# Patient Record
Sex: FEMALE | Race: ASIAN | NOT HISPANIC OR LATINO | Employment: OTHER | URBAN - METROPOLITAN AREA
[De-identification: names, ages, dates, MRNs, and addresses within clinical notes are randomized per-mention and may not be internally consistent; named-entity substitution may affect disease eponyms.]

---

## 2018-12-14 ENCOUNTER — HOSPITAL ENCOUNTER (OUTPATIENT)
Dept: ULTRASOUND IMAGING | Facility: HOSPITAL | Age: 28
Discharge: HOME | End: 2018-12-14
Attending: FAMILY MEDICINE

## 2018-12-14 ENCOUNTER — COMMUNICATION - HEALTHEAST (OUTPATIENT)
Dept: FAMILY MEDICINE | Facility: CLINIC | Age: 28
End: 2018-12-14

## 2018-12-14 ENCOUNTER — OFFICE VISIT - HEALTHEAST (OUTPATIENT)
Dept: FAMILY MEDICINE | Facility: CLINIC | Age: 28
End: 2018-12-14

## 2018-12-14 ENCOUNTER — COMMUNICATION - HEALTHEAST (OUTPATIENT)
Dept: TELEHEALTH | Facility: CLINIC | Age: 28
End: 2018-12-14

## 2018-12-14 DIAGNOSIS — L70.0 ACNE VULGARIS: ICD-10-CM

## 2018-12-14 DIAGNOSIS — Z30.011 ENCOUNTER FOR INITIAL PRESCRIPTION OF CONTRACEPTIVE PILLS: ICD-10-CM

## 2018-12-14 DIAGNOSIS — N83.201 RIGHT OVARIAN CYST: ICD-10-CM

## 2018-12-14 DIAGNOSIS — R53.83 FATIGUE, UNSPECIFIED TYPE: ICD-10-CM

## 2018-12-14 DIAGNOSIS — R10.32 LLQ ABDOMINAL PAIN: ICD-10-CM

## 2018-12-14 DIAGNOSIS — Z76.89 ENCOUNTER TO ESTABLISH CARE: ICD-10-CM

## 2018-12-14 LAB
ERYTHROCYTE [DISTWIDTH] IN BLOOD BY AUTOMATED COUNT: 12.8 % (ref 11–14.5)
HCG UR QL: NEGATIVE
HCT VFR BLD AUTO: 37.2 % (ref 35–47)
HGB BLD-MCNC: 12.3 G/DL (ref 12–16)
MCH RBC QN AUTO: 27.2 PG (ref 27–34)
MCHC RBC AUTO-ENTMCNC: 33 G/DL (ref 32–36)
MCV RBC AUTO: 82 FL (ref 80–100)
PLATELET # BLD AUTO: 382 THOU/UL (ref 140–440)
PMV BLD AUTO: 7.2 FL (ref 7–10)
RBC # BLD AUTO: 4.51 MILL/UL (ref 3.8–5.4)
SP GR UR STRIP: 1.02 (ref 1–1.03)
TSH SERPL DL<=0.005 MIU/L-ACNC: 1.82 UIU/ML (ref 0.3–5)
WBC: 8 THOU/UL (ref 4–11)

## 2018-12-14 ASSESSMENT — MIFFLIN-ST. JEOR: SCORE: 1751.22

## 2018-12-18 ENCOUNTER — COMMUNICATION - HEALTHEAST (OUTPATIENT)
Dept: FAMILY MEDICINE | Facility: CLINIC | Age: 28
End: 2018-12-18

## 2019-10-03 ENCOUNTER — OFFICE VISIT (OUTPATIENT)
Dept: URGENT CARE | Facility: URGENT CARE | Age: 29
End: 2019-10-03
Payer: COMMERCIAL

## 2019-10-03 VITALS
TEMPERATURE: 98.2 F | DIASTOLIC BLOOD PRESSURE: 78 MMHG | OXYGEN SATURATION: 97 % | RESPIRATION RATE: 16 BRPM | SYSTOLIC BLOOD PRESSURE: 118 MMHG | HEART RATE: 93 BPM | WEIGHT: 252 LBS

## 2019-10-03 DIAGNOSIS — J02.9 VIRAL PHARYNGITIS: Primary | ICD-10-CM

## 2019-10-03 DIAGNOSIS — J02.9 SORE THROAT: ICD-10-CM

## 2019-10-03 LAB
DEPRECATED S PYO AG THROAT QL EIA: NORMAL
SPECIMEN SOURCE: NORMAL

## 2019-10-03 PROCEDURE — 99203 OFFICE O/P NEW LOW 30 MIN: CPT | Performed by: PHYSICIAN ASSISTANT

## 2019-10-03 PROCEDURE — 87081 CULTURE SCREEN ONLY: CPT | Performed by: PHYSICIAN ASSISTANT

## 2019-10-03 PROCEDURE — 87880 STREP A ASSAY W/OPTIC: CPT | Performed by: FAMILY MEDICINE

## 2019-10-03 NOTE — LETTER
[facility]  [address]  [address]  [facility] [id number] [phone]          October 3, 2019    RE:  Elayne Burciaga                                                                                                                                                       [address]  [facility] [address]            To whom it may concern:    Elayne Burciaga was seen here today for evaluation of an acute medical problem. Please excuse her from missed work today and tomorrow.     She may return to full work Monday (10/7/19)      Sincerely,        Theodore Coronado PA-C

## 2019-10-03 NOTE — PROGRESS NOTES
Elayne Burciaga presents to  today for evaluation of ST, subjective fever, generalized waxing and waning body aches and generalized waxing and waning HA x 2 days  duration.     Illness Exp: Denies any close contact Strep or Mono exposure. Positive hx exposure to stomach flu at work     ROS:     HEENT: Positive as per above   RESP: No cough, wheezing or SOB   GI: 3 isolated small emesis (no bloody or black vomitu) Denies any diarrhea No abdominal pain.  URINARY REVIEW:  Still reports good PO fluid intake and normal urine output. Denies any dysuria, urinary urgency/frequency, hematuria, back or flank pain.   Normal BM's  SKIN: Denies rash  NEURO: Positive for generalized, waxing and waning HA as per above. Negative for severe HA, neck stiffness, mental status changes or lethargy.   MUS/SKEL: Positive for generalized, waxing and waning body aches since onset of ST 2 days ago. Denies any focal or one sided pain, weakness, numbness or tingling   RHEUM: denies any hx of RA or other inflammatory arthritis. Denies any red, warm, or swollen joints     Social History     Socioeconomic History     Marital status:      Spouse name: Not on file     Number of children: Not on file     Years of education: Not on file     Highest education level: Not on file   Occupational History     Not on file   Social Needs     Financial resource strain: Not on file     Food insecurity:     Worry: Not on file     Inability: Not on file     Transportation needs:     Medical: Not on file     Non-medical: Not on file   Tobacco Use     Smoking status: Never Smoker     Smokeless tobacco: Never Used   Substance and Sexual Activity     Alcohol use: Not on file     Drug use: Not on file     Sexual activity: Not on file   Lifestyle     Physical activity:     Days per week: Not on file     Minutes per session: Not on file     Stress: Not on file   Relationships     Social connections:     Talks on phone: Not on file     Gets together: Not on  file     Attends L[employer] service: Not on file     Active member of club or organization: Not on file     Attends meetings of clubs or organizations: Not on file     Relationship status: Not on file     Intimate partner violence:     Fear of current or ex partner: Not on file     Emotionally abused: Not on file     Physically abused: Not on file     Forced sexual activity: Not on file   Other Topics Concern     Not on file   Social History Narrative     Not on file       PMHX: Denies any chronic medical problems or chronic use of Rx medicaitons     No current outpatient medications on file.     No current facility-administered medications for this visit.          Allergies   Allergen Reactions     Codeine    OBJECTIVE:  /78   Pulse 93   Temp 98.2  F (36.8  C)   Resp 16   Wt 114.3 kg (252 lb)   SpO2 97%       General appearance: alert and no apparent distress  Skin color is pink and without rash.  HEENT:   Conjunctiva not injected.  Sclera clear.  Left TM is normal: no effusions, no erythema, and normal landmarks.  Right TM is normal: no effusions, no erythema, and normal landmarks.  Nasal mucosa is congested   Oropharyngeal exam is positive for mild, diffuse, erythema.  Uvula is midline. No plaque, exudate, lesions, or ulcers.   Neck is supple, FROM with no adenopathy  CARDIAC:NORMAL - regular rate and rhythm without murmur.  RESP: Normal - CTA without rales, rhonchi, or wheezing.  ABDOMEN:  Soft, non-tender, non-distended.  Positive normal bowel sounds.  No HSM or masses.  No suprapubic tenderness.  No CVA tenderness.  GYN: . No pelvic pain or abnl vaginal discharge. LMP at expected interval. LMP approx 10 days ago. Declines any HCG or STI screening today  NEURO: Alert and oriented.  Normal s peech and mentation.  CN II/XII grossly intact.  Gait within normal limits.        LAB:   Results for orders placed or performed in visit on 10/03/19   Rapid strep screen   Result Value Ref Range    Specimen  "Description Throat     Rapid Strep A Screen       NEGATIVE: No Group A streptococcal antigen detected by immunoassay, await culture report.         ASSESSMENT/PLAN:    (J02.9) Viral pharyngitis  (primary encounter diagnosis)  Comment: ST. RST negative. No evidence of secondary bacterial infection. Very reassuring exam here today.   Plan:   Follow-up with PCP if sxs change, worsen or fail to resolve with home comfort care measures over the next 5-7 days.  In addition to the above, \"red flag\" signs and sxs are reviewed with pt both verbally and by way of printed educational material for home review.  Pt verbalizes understanding of and agrees to the above plan.       (J02.9) Sore throat  Plan: Rapid strep screen          "

## 2019-10-03 NOTE — PATIENT INSTRUCTIONS
Patient Education     Viral Pharyngitis (Sore Throat)    You or your child have pharyngitis (sore throat). This infection is caused by a virus. It can cause throat pain that is worse when swallowing, aching all over, headache, and fever. The infection may be spread by coughing, kissing, or touching others after touching your mouth or nose. Antibiotic medicines do not work against viruses. They are not used for treating this illness.  Home care    If symptoms are severe, you or your child should rest at home. Return to work or school when you or your child feel well enough.     You or your child should drink plenty of fluids to prevent dehydration.    Use throat lozenges or numbing throat sprays to help reduce pain. Gargling with warm salt water will also help reduce throat pain. Dissolve 1/2 teaspoon of salt in 1 glass of warm water. Children can sip on juice or a popsicle. Children 5 years and older can also suck on a lollipop or hard candy.    Don t eat salty or spicy foods or give them to your child. These can be irritating to the throat.  Medicines for a child: You can give your child acetaminophen for fever, fussiness, or discomfort. In babies over 6 months of age, you may use ibuprofen instead of acetaminophen. If your child has chronic liver or kidney disease or ever had a stomach ulcer or GI bleeding, talk with your child s healthcare provider before giving these medicines. Aspirin should never be used by any child under 18 years of age who has a fever. It may cause severe liver damage.  Medicines for an adult: You may use acetaminophen or ibuprofen to control pain or fever, unless another medicine was prescribed for this. If you have chronic liver or kidney disease or ever had a stomach ulcer or GI bleeding, talk with your healthcare provider before using these medicines.  Follow-up care  Follow up with a healthcare provider or our staff if you or your child are not getting better over the next  week.  When to seek medical advice  Call your healthcare provider right away if any of these occur:    Fever as directed by your healthcare provider.  For children, seek care if:  ? Your child is of any age and has repeated fevers above 104 F (40 C).  ? Your child is younger than 2 years of age and has a fever of 100.4 F (38 C) for more than 1 day.  ? Your child is 2 years old or older and has a fever of 100.4 F (38 C) for more than 3 days.    New or worsening ear pain, sinus pain, or headache    Painful lumps in the back of neck    Stiff neck    Lymph nodes are getting larger    Can t swallow liquids, a lot of drooling, or can t open mouth wide due to throat pain    Signs of dehydration, such as very dark urine or no urine, sunken eyes, dizziness    Trouble breathing or noisy breathing    Muffled voice    New rash    Other symptoms are getting worse  Date Last Reviewed: 10/1/2017    [id number] [employer]. [address] [place] [address], [place] [address]. All rights reserved. This information is not intended as a substitute for professional medical care. Always follow your healthcare professional's instructions.

## 2019-10-04 LAB
BACTERIA SPEC CULT: NORMAL
SPECIMEN SOURCE: NORMAL

## 2019-10-10 ENCOUNTER — COMMUNICATION - HEALTHEAST (OUTPATIENT)
Dept: FAMILY MEDICINE | Facility: CLINIC | Age: 29
End: 2019-10-10

## 2019-11-01 ENCOUNTER — HEALTH MAINTENANCE LETTER (OUTPATIENT)
Age: 29
End: 2019-11-01

## 2020-01-06 ENCOUNTER — COMMUNICATION - HEALTHEAST (OUTPATIENT)
Dept: FAMILY MEDICINE | Facility: CLINIC | Age: 30
End: 2020-01-06

## 2020-09-14 ENCOUNTER — VIRTUAL VISIT (OUTPATIENT)
Dept: FAMILY MEDICINE | Facility: OTHER | Age: 30
End: 2020-09-14

## 2020-09-14 DIAGNOSIS — Z20.822 SUSPECTED COVID-19 VIRUS INFECTION: Primary | ICD-10-CM

## 2020-09-14 NOTE — PROGRESS NOTES
"Date: 2020 14:14:22  Clinician: Wil Macedo  Clinician NPI: [id number]  Patient: sam mendoza  Patient : 1990  Patient Address: [address] [place] [address]  Patient Phone: [phone]  Visit Protocol: URI  Patient Summary:  may is a 30 year old ( : 1990 ) female who initiated a [facility] Visit for COVID-19 (Coronavirus) evaluation and screening. When asked the question \"Please sign me up to receive news, health information and promotions. \", may responded \"No\".    may states her symptoms started gradually 3-4 days ago. After her symptoms started, they improved and then got worse again.   Her symptoms consist of ear pain, facial pain or pressure, vomiting, nausea, a sore throat, tooth pain, diarrhea, a cough, a headache, chills, and malaise.   Symptom details     Cough: may coughs a few times an hour and her cough is more bothersome at night. Phlegm comes into her throat when she coughs. She believes her cough is caused by post-nasal drip. The color of the phlegm is green.     Sore throat: may reports having moderate throat pain (4-6 on a 10 point pain scale), has exudate on her tonsils, and can swallow liquids. She is not sure if the lymph nodes in her neck are enlarged. A rash has not appeared on the skin since the sore throat started.     Facial pain or pressure: The facial pain or pressure feels worse when bending over or leaning forward.     Headache: She states the headache is severe (7-9 on a 10 point pain scale).     Tooth pain: The tooth pain is caused by a cavity, recent dental work, or other mouth problems.      may denies having anosmia, fever, rhinitis, wheezing, ageusia, nasal congestion, and myalgias. She also denies taking antibiotic medication in the past month, having recent facial or sinus surgery in the past 60 days, and having a sinus infection within the past year. She is not experiencing dyspnea.   Precipitating events  may is not sure if she has been " exposed to someone with strep throat. She has not recently been exposed to someone with influenza. may has been in close contact with the following high risk individuals: adults 65 or older, people with asthma, heart disease or diabetes, and immunocompromised people.   Pertinent COVID-19 (Coronavirus) information  In the past 14 days, may has not worked in a congregate living setting.   She either works or volunteers as a healthcare worker or a , or works or volunteers in a healthcare facility. She does not provide direct patient care. 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 She lives with a healthcare worker.   may has had a close contact with a laboratory-confirmed COVID-19 patient within 14 days of symptom onset.   Since December 2019, may and has not had upper respiratory infection or influenza-like illness. Has not been diagnosed with lab-confirmed COVID-19 test   Pertinent medical history  may does not get yeast infections when she takes antibiotics.   may needs a return to work/school note.   Weight: 240 lbs   may does not smoke or use smokeless tobacco.   She is not sure if she is pregnant and denies breastfeeding. Her last period was over a month ago.   Weight: 240 lbs    MEDICATIONS: No current medications, ALLERGIES: NKDA  Clinician Response:  Dear may,   Your symptoms show that you may have coronavirus (COVID-19). This illness can cause fever, cough and trouble breathing. Many people get a mild case and get better on their own. Some people can get very sick.  What should I do?  We would like to test you for this virus.   1. Please call [phone] to schedule your visit. Explain that [facility]-19 [facility]. Be ready to share your [facility] visit ID number.  The following will serve as your written order for this COVID Test, ordered by me,  "for the indication of suspected COVID [Z20.828]: The test will be ordered in [email or web address], our electronic health record, after you are scheduled. It will show as ordered and authorized by Richard Main MD.  Order: COVID-19 (Coronavirus) PCR for SYMPTOMATIC [facility] from [facility].      2. When it's time for your COVID test:  Stay at least 6 feet away from others. (If someone will drive you to your test, stay in the backseat, as far away from the  as you can.)   Cover your mouth and nose with a mask, tissue or washcloth.  Go straight to the testing site. Don't make any stops on the way there or back.      3.Starting now: Stay home and away from others (self-isolate) until:   You've had no fever---and no medicine that reduces fever---for one full day (24 hours). And...   Your other symptoms have gotten better. For example, your cough or breathing has improved. And...   At least 10 days have passed since your symptoms started.       During this time, don't leave the house except for testing or medical care.   Stay in your own room, even for meals. Use your own bathroom if you can.   Stay away from others in your home. No hugging, kissing or shaking hands. No visitors.  Don't go to work, school or anywhere else.    Clean \"high touch\" surfaces often (doorknobs, counters, handles, etc.). Use a household cleaning spray or wipes. You'll find a full list of  on the EPA website: www.epa.gov/pesticide-registration/list-n-disinfectants-use-against-sars-cov-2.   Cover your mouth and nose with a mask, tissue or washcloth to avoid spreading germs.  Wash your hands and face often. Use soap and water.  Caregivers in these groups are at risk for severe illness due to COVID-19:  o People 65 years and older  o People who live in a nursing home or long-term care facility  o People with chronic disease (lung, heart, cancer, diabetes, kidney, liver, immunologic)  o People who have a weakened immune system, including those who:   Are in " cancer treatment  Take medicine that weakens the immune system, such as corticosteroids  Had a bone marrow or organ transplant  Have an immune deficiency  Have poorly controlled HIV or AIDS  Are obese (body mass index of 40 or higher)  Smoke regularly   o Caregivers should wear gloves while washing dishes, handling laundry and cleaning bedrooms and bathrooms.  o Use caution when washing and drying laundry: Don't shake dirty laundry, and use the warmest water setting that you can.  o For more tips, go to www.cdc.gov/coronavirus/2019-ncov/downloads/10Things.pdf.       How can I take care of myself?   Get lots of rest. Drink extra fluids (unless a doctor has told you not to).   Take Tylenol (acetaminophen) for fever or pain. If you have liver or kidney problems, ask your family doctor if it's okay to take Tylenol.   Adults can take either:    650 mg (two 325 mg pills) every 4 to 6 hours, or...   1,000 mg (two 500 mg pills) every 8 hours as needed.    Note: Don't take more than 3,000 mg in one day. Acetaminophen is found in many medicines (both prescribed and over-the-counter medicines). Read all labels to be sure you don't take too much.   For children, check the Tylenol bottle for the right dose. The dose is based on the child's age or weight.    If you have other health problems (like cancer, heart failure, an organ transplant or severe kidney disease): Call your specialty clinic if you don't feel better in the next 2 days.       Know when to call 911. Emergency warning signs include:    Trouble breathing or shortness of breath Pain or pressure in the chest that doesn't go away Feeling confused like you haven't felt before, or not being able to wake up Bluish-colored lips or face.  Where can I get more information?    [facility] -- About COVID-19: [email or web address]   CDC -- What to Do If You're Sick: www.cdc.gov/coronavirus/2019-ncov/about/steps-when-sick.html   CDC -- Ending Home Isolation:  www.cdc.gov/coronavirus/2019-ncov/hcp/disposition-in-home-patients.html   [facility] -- Caring for Someone: www.cdc.gov/coronavirus/2019-ncov/if-you-are-sick/care-for-someone.html   [facility] -- Interim Guidance for Hospital Discharge to Home: www.[facility].[place]/diseases/coronavirus/hcp/hospdischarge.pdf   [facility] clinical trials (COVID-19 research studies): clinicalaffairs.[facility]-clinical-trials    Below are the COVID-19 hotlines at the [place] Department of Health ([facility]). Interpreters are available.    For health questions: Call 370-556-3410 or 1-294.864.5625 (7 a.m. to 7 p.m.) For questions about schools and childcare: Call 985-706-6034 or 1-152.645.1254 (7 a.m. to 7 p.m.)    Diagnosis: Cough  Diagnosis ICD: R05

## 2020-09-15 DIAGNOSIS — Z20.822 SUSPECTED COVID-19 VIRUS INFECTION: ICD-10-CM

## 2020-09-15 PROCEDURE — U0003 INFECTIOUS AGENT DETECTION BY NUCLEIC ACID (DNA OR RNA); SEVERE ACUTE RESPIRATORY SYNDROME CORONAVIRUS 2 (SARS-COV-2) (CORONAVIRUS DISEASE [COVID-19]), AMPLIFIED PROBE TECHNIQUE, MAKING USE OF HIGH THROUGHPUT TECHNOLOGIES AS DESCRIBED BY CMS-2020-01-R: HCPCS | Performed by: FAMILY MEDICINE

## 2020-09-16 LAB
SARS-COV-2 RNA SPEC QL NAA+PROBE: NOT DETECTED
SPECIMEN SOURCE: NORMAL

## 2020-11-07 ENCOUNTER — HEALTH MAINTENANCE LETTER (OUTPATIENT)
Age: 30
End: 2020-11-07

## 2020-12-13 ENCOUNTER — APPOINTMENT (OUTPATIENT)
Dept: ULTRASOUND IMAGING | Facility: CLINIC | Age: 30
End: 2020-12-13
Attending: EMERGENCY MEDICINE

## 2020-12-13 ENCOUNTER — HOSPITAL ENCOUNTER (EMERGENCY)
Facility: CLINIC | Age: 30
Discharge: HOME | End: 2020-12-13
Attending: EMERGENCY MEDICINE | Admitting: EMERGENCY MEDICINE

## 2020-12-13 VITALS
DIASTOLIC BLOOD PRESSURE: 72 MMHG | SYSTOLIC BLOOD PRESSURE: 122 MMHG | RESPIRATION RATE: 18 BRPM | HEART RATE: 75 BPM | TEMPERATURE: 98.1 F | OXYGEN SATURATION: 97 %

## 2020-12-13 DIAGNOSIS — R10.9 ACUTE ABDOMINAL PAIN: ICD-10-CM

## 2020-12-13 LAB
ALBUMIN SERPL-MCNC: 2.8 G/DL (ref 3.4–5)
ALBUMIN UR-MCNC: 300 MG/DL
ALP SERPL-CCNC: 75 U/L (ref 40–150)
ALT SERPL W P-5'-P-CCNC: 20 U/L (ref 0–50)
ANION GAP SERPL CALCULATED.3IONS-SCNC: 4 MMOL/L (ref 3–14)
APPEARANCE UR: CLEAR
AST SERPL W P-5'-P-CCNC: 14 U/L (ref 0–45)
BACTERIA #/AREA URNS HPF: ABNORMAL /HPF
BASOPHILS # BLD AUTO: 0 10E9/L (ref 0–0.2)
BASOPHILS NFR BLD AUTO: 0.2 %
BILIRUB SERPL-MCNC: 0.1 MG/DL (ref 0.2–1.3)
BILIRUB UR QL STRIP: NEGATIVE
BUN SERPL-MCNC: 17 MG/DL (ref 7–30)
CALCIUM SERPL-MCNC: 8.7 MG/DL (ref 8.5–10.1)
CHLORIDE SERPL-SCNC: 107 MMOL/L (ref 94–109)
CO2 SERPL-SCNC: 27 MMOL/L (ref 20–32)
COLOR UR AUTO: ABNORMAL
CREAT SERPL-MCNC: 0.88 MG/DL (ref 0.52–1.04)
DIFFERENTIAL METHOD BLD: NORMAL
EOSINOPHIL # BLD AUTO: 0.2 10E9/L (ref 0–0.7)
EOSINOPHIL NFR BLD AUTO: 1.9 %
ERYTHROCYTE [DISTWIDTH] IN BLOOD BY AUTOMATED COUNT: 13.7 % (ref 10–15)
GFR SERPL CREATININE-BSD FRML MDRD: 88 ML/MIN/{1.73_M2}
GLUCOSE SERPL-MCNC: 99 MG/DL (ref 70–99)
GLUCOSE UR STRIP-MCNC: NEGATIVE MG/DL
HCG SERPL QL: NEGATIVE
HCT VFR BLD AUTO: 36.7 % (ref 35–47)
HGB BLD-MCNC: 11.9 G/DL (ref 11.7–15.7)
HGB UR QL STRIP: ABNORMAL
HYALINE CASTS #/AREA URNS LPF: 4 /LPF (ref 0–2)
IMM GRANULOCYTES # BLD: 0 10E9/L (ref 0–0.4)
IMM GRANULOCYTES NFR BLD: 0.2 %
INTERPRETATION ECG - MUSE: NORMAL
KETONES UR STRIP-MCNC: NEGATIVE MG/DL
LEUKOCYTE ESTERASE UR QL STRIP: ABNORMAL
LIPASE SERPL-CCNC: 59 U/L (ref 73–393)
LYMPHOCYTES # BLD AUTO: 3.2 10E9/L (ref 0.8–5.3)
LYMPHOCYTES NFR BLD AUTO: 32.2 %
MCH RBC QN AUTO: 28.5 PG (ref 26.5–33)
MCHC RBC AUTO-ENTMCNC: 32.4 G/DL (ref 31.5–36.5)
MCV RBC AUTO: 88 FL (ref 78–100)
MONOCYTES # BLD AUTO: 0.6 10E9/L (ref 0–1.3)
MONOCYTES NFR BLD AUTO: 6.2 %
MUCOUS THREADS #/AREA URNS LPF: PRESENT /LPF
NEUTROPHILS # BLD AUTO: 6 10E9/L (ref 1.6–8.3)
NEUTROPHILS NFR BLD AUTO: 59.3 %
NITRATE UR QL: NEGATIVE
NRBC # BLD AUTO: 0 10*3/UL
NRBC BLD AUTO-RTO: 0 /100
PH UR STRIP: 6 PH (ref 5–7)
PLATELET # BLD AUTO: 352 10E9/L (ref 150–450)
POTASSIUM SERPL-SCNC: 3.7 MMOL/L (ref 3.4–5.3)
PROT SERPL-MCNC: 6.8 G/DL (ref 6.8–8.8)
RBC # BLD AUTO: 4.17 10E12/L (ref 3.8–5.2)
RBC #/AREA URNS AUTO: 8 /HPF (ref 0–2)
SODIUM SERPL-SCNC: 138 MMOL/L (ref 133–144)
SOURCE: ABNORMAL
SP GR UR STRIP: 1.02 (ref 1–1.03)
SQUAMOUS #/AREA URNS AUTO: 4 /HPF (ref 0–1)
UROBILINOGEN UR STRIP-MCNC: NORMAL MG/DL (ref 0–2)
WBC # BLD AUTO: 10.1 10E9/L (ref 4–11)
WBC #/AREA URNS AUTO: 10 /HPF (ref 0–5)

## 2020-12-13 PROCEDURE — 250N000011 HC RX IP 250 OP 636: Performed by: EMERGENCY MEDICINE

## 2020-12-13 PROCEDURE — 93005 ELECTROCARDIOGRAM TRACING: CPT

## 2020-12-13 PROCEDURE — 96375 TX/PRO/DX INJ NEW DRUG ADDON: CPT

## 2020-12-13 PROCEDURE — 80053 COMPREHEN METABOLIC PANEL: CPT | Performed by: EMERGENCY MEDICINE

## 2020-12-13 PROCEDURE — 96374 THER/PROPH/DIAG INJ IV PUSH: CPT

## 2020-12-13 PROCEDURE — 76705 ECHO EXAM OF ABDOMEN: CPT

## 2020-12-13 PROCEDURE — 83690 ASSAY OF LIPASE: CPT | Performed by: EMERGENCY MEDICINE

## 2020-12-13 PROCEDURE — 81001 URINALYSIS AUTO W/SCOPE: CPT | Performed by: EMERGENCY MEDICINE

## 2020-12-13 PROCEDURE — 250N000009 HC RX 250: Performed by: EMERGENCY MEDICINE

## 2020-12-13 PROCEDURE — 84703 CHORIONIC GONADOTROPIN ASSAY: CPT | Performed by: EMERGENCY MEDICINE

## 2020-12-13 PROCEDURE — 99285 EMERGENCY DEPT VISIT HI MDM: CPT | Mod: 25

## 2020-12-13 PROCEDURE — 85025 COMPLETE CBC W/AUTO DIFF WBC: CPT | Performed by: EMERGENCY MEDICINE

## 2020-12-13 RX ORDER — FAMOTIDINE 20 MG/1
20 TABLET, FILM COATED ORAL 2 TIMES DAILY
Qty: 40 TABLET | Refills: 0 | Status: SHIPPED | OUTPATIENT
Start: 2020-12-13 | End: 2021-10-18

## 2020-12-13 RX ORDER — OXYCODONE AND ACETAMINOPHEN 5; 325 MG/1; MG/1
1-2 TABLET ORAL EVERY 4 HOURS PRN
Qty: 12 TABLET | Refills: 0 | Status: SHIPPED | OUTPATIENT
Start: 2020-12-13 | End: 2021-10-18

## 2020-12-13 RX ORDER — ONDANSETRON 4 MG/1
4 TABLET, ORALLY DISINTEGRATING ORAL EVERY 8 HOURS PRN
Qty: 10 TABLET | Refills: 0 | Status: SHIPPED | OUTPATIENT
Start: 2020-12-13 | End: 2020-12-13

## 2020-12-13 RX ORDER — HYDROMORPHONE HYDROCHLORIDE 1 MG/ML
0.5 INJECTION, SOLUTION INTRAMUSCULAR; INTRAVENOUS; SUBCUTANEOUS
Status: DISCONTINUED | OUTPATIENT
Start: 2020-12-13 | End: 2020-12-13 | Stop reason: HOSPADM

## 2020-12-13 RX ORDER — ONDANSETRON 4 MG/1
4 TABLET, ORALLY DISINTEGRATING ORAL EVERY 8 HOURS PRN
Qty: 10 TABLET | Refills: 0 | Status: SHIPPED | OUTPATIENT
Start: 2020-12-13 | End: 2020-12-16

## 2020-12-13 RX ORDER — ONDANSETRON 2 MG/ML
4 INJECTION INTRAMUSCULAR; INTRAVENOUS EVERY 30 MIN PRN
Status: DISCONTINUED | OUTPATIENT
Start: 2020-12-13 | End: 2020-12-13 | Stop reason: HOSPADM

## 2020-12-13 RX ADMIN — ONDANSETRON 4 MG: 2 INJECTION INTRAMUSCULAR; INTRAVENOUS at 04:49

## 2020-12-13 RX ADMIN — HYDROMORPHONE HYDROCHLORIDE 0.5 MG: 1 INJECTION, SOLUTION INTRAMUSCULAR; INTRAVENOUS; SUBCUTANEOUS at 04:49

## 2020-12-13 RX ADMIN — FAMOTIDINE 20 MG: 10 INJECTION, SOLUTION INTRAVENOUS at 04:49

## 2020-12-13 ASSESSMENT — ENCOUNTER SYMPTOMS
DIZZINESS: 1
NAUSEA: 1
ABDOMINAL PAIN: 1
VOMITING: 0

## 2020-12-13 NOTE — DISCHARGE INSTRUCTIONS
Please follow up with your primary doctor and consider getting a HIDA scan to further look at your gall badder     Discharge Instructions  Abdominal Pain    Abdominal pain (belly pain) can be caused by many things. Your evaluation today does not show the exact cause for your pain. Your provider today has decided that it is unlikely your pain is due to a life threatening problem, or a problem requiring surgery or hospital admission. Sometimes those problems cannot be found right away, so it is very important that you follow up as directed.  Sometimes only the changes which occur over time allow the cause of your pain to be found.    Generally, every Emergency Department visit should have a follow-up clinic visit with either a primary or a specialty clinic/provider. Please follow-up as instructed by your emergency provider today. With abdominal pain, we often recommend very close follow-up, such as the following day.    ADULTS:  Return to the Emergency Department right away if:    You get an oral temperature above 102oF or as directed by your provider.  You have blood in your stools. This may be bright red or appear as black, tarry stools.    You keep vomiting (throwing up) or cannot drink liquids.  You see blood when you vomit.   You cannot have a bowel movement or you cannot pass gas.  Your stomach gets bloated or bigger.  Your skin or the whites of your eyes look yellow.  You faint.  You have bloody, frequent or painful urination (peeing).  You have new symptoms or anything that worries you.    CHILDREN:  Return to the Emergency Department right away if your child has any of the above-listed symptoms or the following:    Pushes your hand away or screams/cries when his/her belly is touched.  You notice your child is very fussy or weak.  Your child is very tired and is too tired to eat or drink.  Your child is dehydrated.  Signs of dehydration can be:  Significant change in the amount of wet diapers/urine.  Your  infant or child starts to have dry mouth and lips, or no saliva (spit) or tears.    PREGNANT WOMEN:  Return to the Emergency Department right away if you have any of the above-listed symptoms or the following:    You have bleeding, leaking fluid or passing tissue from the vagina.  You have worse pain or cramping, or pain in your shoulder or back.  You have vomiting that will not stop.  You have a temperature of 100oF or more.  Your baby is not moving as much as usual.  You faint.  You get a bad headache with or without eye problems and abdominal pain.  You have a seizure.  You have unusual discharge from your vagina and abdominal pain.    Abdominal pain is pretty common during pregnancy.  Your pain may or may not be related to your pregnancy. You should follow-up closely with your OB provider so they can evaluate you and your baby.  Until you follow-up with your regular provider, do the following:     Avoid sex and do not put anything in your vagina.  Drink clear fluids.  Only take medications approved by your provider.    MORE INFORMATION:    Appendicitis:  A possible cause of abdominal pain in any person who still has their appendix is acute appendicitis. Appendicitis is often hard to diagnose.  Testing does not always rule out early appendicitis or other causes of abdominal pain. Close follow-up with your provider and re-evaluations may be needed to figure out the reason for your abdominal pain.    Follow-up:  It is very important that you make an appointment with your clinic and go to the appointment.  If you do not follow-up with your primary provider, it may result in missing an important development which could result in permanent injury or disability and/or lasting pain.  If there is any problem keeping your appointment, call your provider or return to the Emergency Department.    Medications:  Take your medications as directed by your provider today.  Before using over-the-counter medications, ask your  "provider and make sure to take the medications as directed.  If you have any questions about medications, ask your provider.    Diet:  Resume your normal diet as much as possible, but do not eat fried, fatty or spicy foods while you have pain.  Do not drink alcohol or have caffeine.  Do not smoke tobacco.    Probiotics: If you have been given an antibiotic, you may want to also take a probiotic pill or eat yogurt with live cultures. Probiotics have \"good bacteria\" to help your intestines stay healthy. Studies have shown that probiotics help prevent diarrhea (loose stools) and other intestine problems (including C. diff infection) when you take antibiotics. You can buy these without a prescription in the pharmacy section of the store.     If you were given a prescription for medicine here today, be sure to read all of the information (including the package insert) that comes with your prescription.  This will include important information about the medicine, its side effects, and any warnings that you need to know about.  The pharmacist who fills the prescription can provide more information and answer questions you may have about the medicine.  If you have questions or concerns that the pharmacist cannot address, please call or return to the Emergency Department.       Remember that you can always come back to the Emergency Department if you are not able to see your regular provider in the amount of time listed above, if you get any new symptoms, or if there is anything that worries you.  Discharge Instructions  Biliary Colic    You have been seen today for biliary colic. Biliary colic is the pain that happens when gallstones block the normal flow of bile from the gallbladder.  It usually is a steady or crampy pain in the upper abdomen (belly), most often under the right side of the rib cage where the gallbladder is. Sometimes you get pain from the gallbladder in your back or shoulder. It is common to have nausea " (sick to stomach) and vomiting (throwing up) with biliary colic.    Bile is a liquid the body makes to help with digesting fat.  It is made by the liver and stored in the gallbladder and released from the gall bladder when you eat fatty foods. Gallstones can form for a variety of reasons. Risk factors for gallstones include being female, having a family history of gallstones, being older, being pregnant or having been pregnant, having diabetes, having rapid weight loss, and others.      Once gallstones form, surgeons usually tell you to have your gallbladder removed. There is medicine that can dissolve gallstones, but it can be unpleasant to take, and gallstones tend to come back when you quit taking the medicine. Your regular provider can help decide on the right treatment for you, and may refer you to a surgeon to discuss whether surgery is right in your case.     Complications of gallstones include infection, jaundice, inflammation of the pancreas, and rupture of the gallbladder. One of these complications will happen to about one out of every four patients with gallstones over the next 10-20 years if they are not treated.       Generally, every Emergency Department visit should have a follow-up clinic visit with either a primary or a specialty clinic/provider. Please follow-up as instructed by your emergency provider today.    Return to the Emergency Department if you develop:  Fever greater than 100.5 F.   Persistent nausea and vomiting.  Pain that will not go away with the medicines you were given here.  Yellow skin or eye color (jaundice).  Other new concerning symptoms.    What can I do to help myself?  Eat regular meals at least three times a day, to make the gallbladder empty before it gets too full.  Avoid fried or fatty foods.  Drink plenty of clear fluids.  Take over-the-counter or prescribed pain medications as recommended by your provider.      If you were given a prescription for medicine here today,  be sure to read all of the information (including the package insert) that comes with your prescription.  This will include important information about the medicine, its side effects, and any warnings that you need to know about.  The pharmacist who fills the prescription can provide more information and answer questions you may have about the medicine.  If you have questions or concerns that the pharmacist cannot address, please call or return to the Emergency Department.     Remember that you can always come back to the Emergency Department if you are not able to see your regular provider in the amount of time listed above, if you get any new symptoms, or if there is anything that worries you.

## 2020-12-13 NOTE — ED PROVIDER NOTES
History     Chief Complaint:  Abdominal Pain    HPI   Elayne Burciaga is a 30 year old female who presents via EMS with abdominal pain. Patient reports that approximately 1 hour ago she developed sharp, tight right upper quadrant abdominal pain with associated nausea and dizziness. She has never had anything like this before. No vomiting or concern for pregnancy. She received 100 mcg fentanyl per EMS.     Allergies:  Codeine      Medications:    Susan     Past Medical History:    Medical history reviewed. No pertinent medical history.     Past Surgical History:    Ovarian cyst removal   Breast biopsy, left    section     Family History:    Brother: asthma   Father: diabetes   Mother: breast cancer, cervical cancer   Sister: thyroid disease    Social History:  Smoking Status: Never smoker   Smokeless Tobacco: Never used   Marital Status:       Review of Systems   Gastrointestinal: Positive for abdominal pain and nausea. Negative for vomiting.   Neurological: Positive for dizziness.   All other systems reviewed and are negative.        Physical Exam     Patient Vitals for the past 24 hrs:   BP Temp Temp src Pulse Resp SpO2   20 0435 114/76 98.1  F (36.7  C) Oral 70 18 99 %     Physical Exam  Constitutional:  Oriented to person, place, and time. Minor distress secondary to pain.   HENT:   Head:    Normocephalic.   Mouth/Throat:   Oropharynx is clear and moist.   Eyes:    EOM are normal. Pupils are equal, round, and reactive to light.   Neck:    Neck supple.   Cardiovascular:  Normal rate, regular rhythm and normal heart sounds.      Exam reveals no gallop and no friction rub.       No murmur heard.  Pulmonary/Chest:  Effort normal and breath sounds normal.      No respiratory distress. No wheezes. No rales.      No reproducible chest wall pain.  Abdominal:   Soft. No distension. Positive Zuluaga's sign with guarding to right upper quadrant noted. No rebound.  Musculoskeletal:  Normal range of motion.    Neurological:   Alert and oriented to person, place, and time.           Moves all 4 extremities spontaneously    Skin:    No rash noted. No pallor.     Emergency Department Course     ECG:  ECG taken at 0432, ECG read at 0432  Normal sinus rhythm  Normal ECG  Rate 75 bpm. NE interval 168 ms. QRS duration 92 ms. QT/QTc 382/426 ms. P-R-T axes 35 24 24.    Imaging:  US Abdomen Limited  1.  No cholelithiasis, pericholecystic fluid or gallbladder wall thickening.  2.  No biliary dilatation.  3.  Liver unremarkable.  4.  No hydronephrosis.  5.  Pancreatic head unremarkable. Distal pancreas not visualized due to bowel gas.  Reading per radiology     Laboratory:  CBC: WBC 10.1, HGB 11.9,   CMP: Bilirubin total 0.1(L), Albumin 2.8(L) o/w WNL (Creatinine 0.88)  Lipase: 59(L)  HCG qualitative blood: Negative      UA with microscopic: 4 squamous.wbc 10, rbc 8, leuk trace, nitrite negative ,    Interventions:  0449 Dilaudid 0.5 mg IV   0449 Zofran 4 mg IV   0449 Pepcid 20 mg IV     Emergency Department Course:  0432 Nursing notes and vitals reviewed.    0433 I performed an exam of the patient as documented above.     0544 Rechecked and updated.      Findings and plan explained to the patient. Patient discharged home with instructions regarding supportive care, medications, and reasons to return. The importance of close follow-up was reviewed. The patient was prescribed Zofran and Percocet.     Impression & Plan      Medical Decision Making:  Elayne Burciaga is a 30 year old female who came in complaining of right upper quadrant abdominal pain. Differential includes biliary colic, ACS equivalent, gastritis, pancreatitis, small bowel obstruction, or other causes. Workup thus far is otherwise reassuring. Her US is negative for obvious biliary disease. She otherwise has a benign abdomen, no elevated white count and reassuring laboratory workup. I highly doubt small bowel obstruction or other surgical process therefore will  avoid CT imaging. Her story still fits for a possible critical picture of biliary colic therefore I did discuss with her and recommend follow up with her primary doctor for consideration for a HIDA scan to have this further assessed. She was told to refrain from eating fatty foods and return for any worsening abdominal pain, fever, vomiting, not tolerating PO. Note UA is likely contaminated specimen with squamous cells and symptoms to suggest UTI or renal colic.     Diagnosis:    ICD-10-CM    1. Acute abdominal pain  R10.9 UA with Microscopic     Disposition:   Discharged to home.      Discharge Medications:  New Prescriptions    ONDANSETRON (ZOFRAN ODT) 4 MG ODT TAB    Take 1 tablet (4 mg) by mouth every 8 hours as needed    ONDANSETRON (ZOFRAN ODT) 4 MG ODT TAB    Take 1 tablet (4 mg) by mouth every 8 hours as needed    OXYCODONE-ACETAMINOPHEN (PERCOCET) 5-325 MG TABLET    Take 1-2 tablets by mouth every 4 hours as needed for pain     Scribe Disclosure:  I, Cristy Triplett, am serving as a scribe at 4:41 AM on 12/13/2020 to document services personally performed by Cristofer Tejeda MD based on my observations and the provider's statements to me.      [facility] EMERGENCY DEPT       Cristofer Tejeda MD  12/13/20 [id number]

## 2020-12-13 NOTE — ED AVS SNAPSHOT
[facility]  [address]  [facility] [address]  Phone: [phone]  Fax: [phone]                                    May Earlene Burciaga   MRN: [id number]    Department: [facility]   Date of Visit: 12/13/2020           After Visit Summary Signature Page    I have received my discharge instructions, and my questions have been answered. I have discussed any challenges I see with this plan with the nurse or doctor.    ..........................................................................................................................................  Patient/Patient Representative Signature      ..........................................................................................................................................  Patient Representative Print Name and Relationship to Patient    ..................................................               ................................................  Date                                   Time    ..........................................................................................................................................  Reviewed by Signature/Title    ...................................................              ..............................................  Date                                               Time          22EPIC Rev 08/18

## 2021-06-02 VITALS — HEIGHT: 61 IN | WEIGHT: 242.25 LBS | BODY MASS INDEX: 45.74 KG/M2

## 2021-06-16 DIAGNOSIS — Z11.59 ENCOUNTER FOR SCREENING FOR OTHER VIRAL DISEASES: ICD-10-CM

## 2021-06-16 PROBLEM — E66.01 MORBID OBESITY (H): Status: ACTIVE | Noted: 2018-12-14

## 2021-06-16 PROBLEM — L70.0 ACNE VULGARIS: Status: ACTIVE | Noted: 2018-12-14

## 2021-06-16 RX ORDER — CITRIC ACID/SODIUM CITRATE 334-500MG
30 SOLUTION, ORAL ORAL
Status: CANCELLED | OUTPATIENT
Start: 2021-06-16

## 2021-06-16 RX ORDER — SODIUM CHLORIDE, SODIUM LACTATE, POTASSIUM CHLORIDE, CALCIUM CHLORIDE 600; 310; 30; 20 MG/100ML; MG/100ML; MG/100ML; MG/100ML
INJECTION, SOLUTION INTRAVENOUS CONTINUOUS
Status: CANCELLED | OUTPATIENT
Start: 2021-06-16

## 2021-06-16 RX ORDER — CEFAZOLIN SODIUM 2 G/100ML
2 INJECTION, SOLUTION INTRAVENOUS
Status: CANCELLED | OUTPATIENT
Start: 2021-06-16

## 2021-06-16 RX ORDER — CEFAZOLIN SODIUM 1 G/3ML
1 INJECTION, POWDER, FOR SOLUTION INTRAMUSCULAR; INTRAVENOUS SEE ADMIN INSTRUCTIONS
Status: CANCELLED | OUTPATIENT
Start: 2021-06-16

## 2021-06-19 NOTE — LETTER
Letter by Ada Mackey DO at      Author: Ada Mackey DO Service: -- Author Type: --    Filed:  Encounter Date: 10/10/2019 Status: Signed       Elayne Nichols  [address]  [place] [address]           October 10, 2019    Dear May    In reviewing your records, we have determined a gap in your preventive services. Based on your age and health history, we recommend the follow service.     ? General Physical  ? Physical with a Pap Smear  ? Colon cancer screening  ? Mammogram  ? Immunization  ? Diabetic check  ? Blood pressure/cardiovascular check  ? Asthma check  ? Cholesterol test  ? Lab work  ? Med check    If you have had the service elsewhere, please contact us so we can update our records. Please let us know if you have transferred your care to another clinic.    Please call [phone] to schedule this appointment.    We believe that a strong preventive care program, including regular physicals and follow-up care is an important part of a healthy lifestyle and we are committed to helping you maintain your health.    Thank you for choosing us as your health care provider.    Sincerely,   [facility]  [address] [place] [address]  Phone Number:  [phone]

## 2021-06-22 NOTE — PROGRESS NOTES
[facility] Note    Elayne Nichols  1990   [id number]    Elayne Nichols is a 28 y.o. female presenting to discuss the following:     CC:   Chief Complaint   Patient presents with     Establish Care     Menorrhagia     Contraception       HPI:    May is a 28-year-old  female presenting today to establish care and for evaluation of heavy vaginal bleeding and for contraception counseling.  She has an 8-month-old infant who delivered 8 months ago by .    After current pregnancy, bleeding nonstop, heavy bleeding, having chills. Periods lasting 2.5 weeks. Having sharp pains on left. Previous ovarian cyst removal.     Stopped breastfeeding after 2 months.     ROS:   CONSTITUTIONAL: Had a fever a few days ago. Appetite is okay. Feels more fatigued.   HEENT: No rhinorrhea, no ear pain, no sore throat.   HEART: Feels lightheaded and dizzy.   LUNGS: No shortness of breath  ABDOMEN: Bilateral low groin pain,  incision site is numb. No constipation or diarrhea.   : Negative  MSK: No myalgias or arthralgias   NEURO: no numbness, no tingling, no weakness   PSYCH: Mood is good  DERM: Having more breakouts than previously     PMH:   Patient Active Problem List   Diagnosis     Acne vulgaris     Morbid obesity (H)       History reviewed. No pertinent past medical history.    PSH:   Past Surgical History:   Procedure Laterality Date      SECTION  2018    second pregnancy, failure to progress     OVARIAN CYST REMOVAL  2011      BREAST CORE BIOPSY LEFT Left 2016         MEDICATIONS:   No current outpatient medications on file prior to visit.     No current facility-administered medications on file prior to visit.        ALLERGIES:  No Known Allergies    FAMHx:  Family History   Problem Relation Age of Onset     Breast cancer Mother      Cervical cancer Mother      Diabetes Father      Thyroid disease Sister      Asthma Brother      Hyperlipidemia Maternal Grandmother       "Deep vein thrombosis Maternal Grandmother      No Medical Problems Sister        SOCIAL HISTORY:   Social History     Socioeconomic History     Marital status:      Spouse name: Not on file     Number of children: Not on file     Years of education: Not on file     Highest education level: Not on file   Social Needs     Financial resource strain: Not on file     Food insecurity - worry: Not on file     Food insecurity - inability: Not on file     Transportation needs - medical: Not on file     Transportation needs - non-medical: Not on file   Occupational History     Not on file   Tobacco Use     Smoking status: Never Smoker     Smokeless tobacco: Never Used   Substance and Sexual Activity     Alcohol use: Not on file     Drug use: Not on file     Sexual activity: Not on file   Other Topics Concern     Not on file   Social History Narrative     Not on file     May's  is Ozzy.  Daughter Lucina.  Works at [employer].  Completed graduate degree.    OB History    Para Term  AB Living   2 2 2 0 0 2   SAB TAB Ectopic Multiple Live Births                  # Outcome Date GA Lbr Gorge/2nd Weight Sex Delivery Anes PTL Lv   2 Term            1 Term               Obstetric Comments   First pregnancy surrogate pregnancy with vaginal delivery.  Second pregnancy with  for failure to progress.       PHYSICAL EXAM:   /62   Pulse 64   Temp 98.3  F (36.8  C) (Oral)   Resp 20   Ht 5' 1\" (1.549 m)   Wt (!) 242 lb 4 oz (109.9 kg)   LMP 12/10/2018 (Exact Date)   Breastfeeding? No   BMI 45.77 kg/m     GENERAL: May is a pleasant, obese  female.  She is in no acute distress.  HEENT: Sclera white, PERRLA, EOMI, normal tympanic membranes bilaterally, no nasal discharge, oropharynx pink and moist, no cervical lymphadenopathy, no no thyromegaly or thyroid nodules palpable.  HEART: Regular rate and rhythm, no murmurs.  LUNGS: Clear to auscultation bilaterally, unlabored.  ABDOMEN: Nice, soft, " mildly tender to palpation left lower quadrant, no guarding, no rigidity, no rebound tenderness.  Bowel sounds present.  PSYCH: Mood is good, affect congruent with mood.  Appropriately dressed.  DERM: Cystic acne across cheeks.    LABS:   Recent Results (from the past 240 hour(s))   HM2(CBC w/o Differential)   Result Value Ref Range    WBC 8.0 4.0 - 11.0 thou/uL    RBC 4.51 3.80 - 5.40 mill/uL    Hemoglobin 12.3 12.0 - 16.0 g/dL    Hematocrit 37.2 35.0 - 47.0 %    MCV 82 80 - 100 fL    MCH 27.2 27.0 - 34.0 pg    MCHC 33.0 32.0 - 36.0 g/dL    RDW 12.8 11.0 - 14.5 %    Platelets 382 140 - 440 thou/uL    MPV 7.2 7.0 - 10.0 fL   Pregnancy, Urine   Result Value Ref Range    Pregnancy Test, Urine Negative Negative    Specific Gravity, UA 1.025 1.001 - 1.030     ASSESSMENT & PLAN:     Elayne Nichols is a 28 y.o. female presenting today to establish care and for evaluation of menorrhagia and contraception counseling.    1. LLQ abdominal pain  - US Pelvis With Transvaginal Non OB; Future  - HM2(CBC w/o Differential)  - Pregnancy, Urine    Discussed differential of recurrent ovarian cyst versus constipation.  PT negative, so therefore unlikely to be ectopic pregnancy.  Given report of chills, will obtain CBC to evaluate for leukocytosis.  Normal white count.  Will proceed with pelvic ultrasound to evaluate for cysts.  Also evaluate uterus for source of heavy bleeding.    2. Fatigue, unspecified type  - HM2(CBC w/o Differential)  - Thyroid [place]    We will evaluate for thyroid abnormality versus anemia.  If both are normal, consider further evaluation of mood discussion of sleep hygiene.    3. Encounter for initial prescription of contraceptive pills  - drospirenone-ethinyl estradiol (JUAN RAMON) 3-0.02 mg per tablet; Take 1 tablet by mouth daily.  Dispense: 84 tablet; Refill: 3    Discussed contraceptive options including OCPs, patch, NuvaRing, Depo-Provera, Nexplanon, and IUDs.  Given today for pregnancy in 1-2 years,  recommended shorter acting agents.  And comorbid.  Current issue has been frequent menstruation, recommended estrogen containing products over the Depo-Provera as I expect these to be exacerbated with that agent.    She does not have history of blood clots, no tobacco use, no migraine headaches.  She is morbidly obese.  We will need to discuss weight management moving forward.    4. Acne vulgaris  - drospirenone-ethinyl estradiol (JUAN RAMON) 3-0.02 mg per tablet; Take 1 tablet by mouth daily.  Dispense: 84 tablet; Refill: 3    Per above, will initiate OCPs to manage constriction, acne, and provide contraception.  Will reassess at follow-up visit.  Discussed importance of taking regularly at the same time every day.    5. Encounter to establish care  Reviewed maze past medical history, surgical history, social history, and family history.  Updated medications and allergies in the chart.    RTC: 1 month -follow-up menorrhagia and abdominal pain, Pap smear due    Ada Mackey DO

## 2021-06-22 NOTE — PROGRESS NOTES
Please call May with her lab results. Thank you!    Fei Holly,  Your thyroid level has returned normal.   We will let you know when we have the CT results back.   Ada Mackey, DO

## 2021-07-03 NOTE — ADDENDUM NOTE
Addendum Note by Jefferson Mackey DO at 12/14/2018  1:00 PM     Author: Jefferson Mackey DO Service: -- Author Type: Physician    Filed: 12/14/2018  6:25 PM Encounter Date: 12/14/2018 Status: Signed    : Jefferson Mackey DO (Physician)    Addended by: JEFFERSON MACKEY on: 12/14/2018 06:25 PM        Modules accepted: Orders

## 2021-08-05 LAB
GROUP B STREPTOCOCCUS (EXTERNAL): ABNORMAL
GROUP B STREPTOCOCCUS (EXTERNAL): NEGATIVE

## 2021-08-09 ENCOUNTER — HOSPITAL ENCOUNTER (INPATIENT)
Facility: CLINIC | Age: 31
LOS: 3 days | Discharge: HOME | End: 2021-08-13
Attending: OBSTETRICS & GYNECOLOGY | Admitting: OBSTETRICS & GYNECOLOGY
Payer: COMMERCIAL

## 2021-08-09 DIAGNOSIS — Z36.89 ENCOUNTER FOR TRIAGE IN PREGNANT PATIENT: Primary | ICD-10-CM

## 2021-08-09 DIAGNOSIS — E66.01 MORBID OBESITY (H): ICD-10-CM

## 2021-08-09 DIAGNOSIS — Z98.891 S/P REPEAT LOW TRANSVERSE C-SECTION: ICD-10-CM

## 2021-08-09 DIAGNOSIS — L70.0 ACNE VULGARIS: ICD-10-CM

## 2021-08-09 PROCEDURE — G0463 HOSPITAL OUTPT CLINIC VISIT: HCPCS

## 2021-08-09 PROCEDURE — 120N000001 HC R&B MED SURG/OB

## 2021-08-09 RX ORDER — FERROUS SULFATE 325(65) MG
325 TABLET ORAL
COMMUNITY

## 2021-08-09 RX ORDER — LIDOCAINE 40 MG/G
CREAM TOPICAL
Status: DISCONTINUED | OUTPATIENT
Start: 2021-08-09 | End: 2021-08-10

## 2021-08-09 RX ORDER — PRENATAL VIT/IRON FUM/FOLIC AC 27MG-0.8MG
1 TABLET ORAL DAILY
COMMUNITY

## 2021-08-09 RX ORDER — TERBUTALINE SULFATE 1 MG/ML
0.25 INJECTION, SOLUTION SUBCUTANEOUS ONCE
Status: COMPLETED | OUTPATIENT
Start: 2021-08-10 | End: 2021-08-10

## 2021-08-09 RX ORDER — CALCIUM CARBONATE 500 MG/1
2 TABLET, CHEWABLE ORAL 2 TIMES DAILY
COMMUNITY
End: 2021-10-18

## 2021-08-10 ENCOUNTER — ANESTHESIA (OUTPATIENT)
Dept: OBGYN | Facility: CLINIC | Age: 31
End: 2021-08-10
Payer: COMMERCIAL

## 2021-08-10 ENCOUNTER — ANESTHESIA EVENT (OUTPATIENT)
Dept: OBGYN | Facility: CLINIC | Age: 31
End: 2021-08-10
Payer: COMMERCIAL

## 2021-08-10 LAB
ABO/RH(D): NORMAL
ANTIBODY SCREEN: NEGATIVE
HGB BLD-MCNC: 9.9 G/DL (ref 11.7–15.7)
SARS-COV-2 RNA RESP QL NAA+PROBE: NEGATIVE
SPECIMEN EXPIRATION DATE: NORMAL
T PALLIDUM AB SER QL: NONREACTIVE

## 2021-08-10 PROCEDURE — 250N000011 HC RX IP 250 OP 636: Performed by: OBSTETRICS & GYNECOLOGY

## 2021-08-10 PROCEDURE — 86780 TREPONEMA PALLIDUM: CPT | Performed by: OBSTETRICS & GYNECOLOGY

## 2021-08-10 PROCEDURE — 250N000011 HC RX IP 250 OP 636: Performed by: NURSE ANESTHETIST, CERTIFIED REGISTERED

## 2021-08-10 PROCEDURE — 96372 THER/PROPH/DIAG INJ SC/IM: CPT | Performed by: OBSTETRICS & GYNECOLOGY

## 2021-08-10 PROCEDURE — 360N000076 HC SURGERY LEVEL 3, PER MIN: Performed by: OBSTETRICS & GYNECOLOGY

## 2021-08-10 PROCEDURE — 85018 HEMOGLOBIN: CPT | Performed by: OBSTETRICS & GYNECOLOGY

## 2021-08-10 PROCEDURE — 258N000003 HC RX IP 258 OP 636: Performed by: OBSTETRICS & GYNECOLOGY

## 2021-08-10 PROCEDURE — 86901 BLOOD TYPING SEROLOGIC RH(D): CPT | Performed by: OBSTETRICS & GYNECOLOGY

## 2021-08-10 PROCEDURE — 250N000013 HC RX MED GY IP 250 OP 250 PS 637: Performed by: OBSTETRICS & GYNECOLOGY

## 2021-08-10 PROCEDURE — 0UT70ZZ RESECTION OF BILATERAL FALLOPIAN TUBES, OPEN APPROACH: ICD-10-PCS | Performed by: OBSTETRICS & GYNECOLOGY

## 2021-08-10 PROCEDURE — 87635 SARS-COV-2 COVID-19 AMP PRB: CPT | Performed by: OBSTETRICS & GYNECOLOGY

## 2021-08-10 PROCEDURE — 710N000010 HC RECOVERY PHASE 1, LEVEL 2, PER MIN: Performed by: OBSTETRICS & GYNECOLOGY

## 2021-08-10 PROCEDURE — 370N000017 HC ANESTHESIA TECHNICAL FEE, PER MIN: Performed by: OBSTETRICS & GYNECOLOGY

## 2021-08-10 PROCEDURE — 250N000011 HC RX IP 250 OP 636: Performed by: ANESTHESIOLOGY

## 2021-08-10 PROCEDURE — 36415 COLL VENOUS BLD VENIPUNCTURE: CPT | Performed by: OBSTETRICS & GYNECOLOGY

## 2021-08-10 PROCEDURE — 250N000009 HC RX 250: Performed by: OBSTETRICS & GYNECOLOGY

## 2021-08-10 PROCEDURE — 88302 TISSUE EXAM BY PATHOLOGIST: CPT | Mod: TC | Performed by: OBSTETRICS & GYNECOLOGY

## 2021-08-10 PROCEDURE — 120N000001 HC R&B MED SURG/OB

## 2021-08-10 PROCEDURE — 272N000001 HC OR GENERAL SUPPLY STERILE: Performed by: OBSTETRICS & GYNECOLOGY

## 2021-08-10 RX ORDER — CARBOPROST TROMETHAMINE 250 UG/ML
250 INJECTION, SOLUTION INTRAMUSCULAR
Status: DISCONTINUED | OUTPATIENT
Start: 2021-08-10 | End: 2021-08-13 | Stop reason: HOSPADM

## 2021-08-10 RX ORDER — TRANEXAMIC ACID 10 MG/ML
1 INJECTION, SOLUTION INTRAVENOUS EVERY 30 MIN PRN
Status: DISCONTINUED | OUTPATIENT
Start: 2021-08-10 | End: 2021-08-10

## 2021-08-10 RX ORDER — ONDANSETRON 2 MG/ML
4 INJECTION INTRAMUSCULAR; INTRAVENOUS EVERY 6 HOURS PRN
Status: DISCONTINUED | OUTPATIENT
Start: 2021-08-10 | End: 2021-08-13 | Stop reason: HOSPADM

## 2021-08-10 RX ORDER — METHYLERGONOVINE MALEATE 0.2 MG/ML
200 INJECTION INTRAVENOUS
Status: DISCONTINUED | OUTPATIENT
Start: 2021-08-10 | End: 2021-08-13 | Stop reason: HOSPADM

## 2021-08-10 RX ORDER — OXYTOCIN/0.9 % SODIUM CHLORIDE 30/500 ML
100-340 PLASTIC BAG, INJECTION (ML) INTRAVENOUS CONTINUOUS PRN
Status: CANCELLED | OUTPATIENT
Start: 2021-08-10

## 2021-08-10 RX ORDER — KETOROLAC TROMETHAMINE 30 MG/ML
30 INJECTION, SOLUTION INTRAMUSCULAR; INTRAVENOUS EVERY 6 HOURS
Status: DISCONTINUED | OUTPATIENT
Start: 2021-08-10 | End: 2021-08-10

## 2021-08-10 RX ORDER — OXYCODONE HYDROCHLORIDE 5 MG/1
5 TABLET ORAL EVERY 4 HOURS PRN
Status: DISCONTINUED | OUTPATIENT
Start: 2021-08-10 | End: 2021-08-13 | Stop reason: HOSPADM

## 2021-08-10 RX ORDER — KETOROLAC TROMETHAMINE 30 MG/ML
30 INJECTION, SOLUTION INTRAMUSCULAR; INTRAVENOUS EVERY 6 HOURS PRN
Status: COMPLETED | OUTPATIENT
Start: 2021-08-10 | End: 2021-08-11

## 2021-08-10 RX ORDER — EPHEDRINE SULFATE 50 MG/ML
5 INJECTION, SOLUTION INTRAMUSCULAR; INTRAVENOUS; SUBCUTANEOUS
Status: DISCONTINUED | OUTPATIENT
Start: 2021-08-10 | End: 2021-08-10 | Stop reason: HOSPADM

## 2021-08-10 RX ORDER — OXYTOCIN/0.9 % SODIUM CHLORIDE 30/500 ML
340 PLASTIC BAG, INJECTION (ML) INTRAVENOUS CONTINUOUS PRN
Status: COMPLETED | OUTPATIENT
Start: 2021-08-10 | End: 2021-08-10

## 2021-08-10 RX ORDER — AMOXICILLIN 250 MG
1 CAPSULE ORAL 2 TIMES DAILY
Status: DISCONTINUED | OUTPATIENT
Start: 2021-08-10 | End: 2021-08-13 | Stop reason: HOSPADM

## 2021-08-10 RX ORDER — ONDANSETRON 4 MG/1
4 TABLET, ORALLY DISINTEGRATING ORAL EVERY 6 HOURS PRN
Status: DISCONTINUED | OUTPATIENT
Start: 2021-08-10 | End: 2021-08-13 | Stop reason: HOSPADM

## 2021-08-10 RX ORDER — HYDROMORPHONE HYDROCHLORIDE 1 MG/ML
.2-.4 INJECTION, SOLUTION INTRAMUSCULAR; INTRAVENOUS; SUBCUTANEOUS EVERY 5 MIN PRN
Status: DISCONTINUED | OUTPATIENT
Start: 2021-08-10 | End: 2021-08-10 | Stop reason: HOSPADM

## 2021-08-10 RX ORDER — CARBOPROST TROMETHAMINE 250 UG/ML
250 INJECTION, SOLUTION INTRAMUSCULAR
Status: DISCONTINUED | OUTPATIENT
Start: 2021-08-10 | End: 2021-08-10

## 2021-08-10 RX ORDER — PRENATAL VIT/IRON FUM/FOLIC AC 27MG-0.8MG
1 TABLET ORAL DAILY
Status: DISCONTINUED | OUTPATIENT
Start: 2021-08-10 | End: 2021-08-13 | Stop reason: HOSPADM

## 2021-08-10 RX ORDER — PROCHLORPERAZINE 25 MG
25 SUPPOSITORY, RECTAL RECTAL EVERY 12 HOURS PRN
Status: DISCONTINUED | OUTPATIENT
Start: 2021-08-10 | End: 2021-08-13 | Stop reason: HOSPADM

## 2021-08-10 RX ORDER — SODIUM CHLORIDE, SODIUM LACTATE, POTASSIUM CHLORIDE, CALCIUM CHLORIDE 600; 310; 30; 20 MG/100ML; MG/100ML; MG/100ML; MG/100ML
INJECTION, SOLUTION INTRAVENOUS CONTINUOUS
Status: DISCONTINUED | OUTPATIENT
Start: 2021-08-10 | End: 2021-08-10 | Stop reason: HOSPADM

## 2021-08-10 RX ORDER — OXYTOCIN 10 [USP'U]/ML
10 INJECTION, SOLUTION INTRAMUSCULAR; INTRAVENOUS
Status: CANCELLED | OUTPATIENT
Start: 2021-08-10

## 2021-08-10 RX ORDER — MISOPROSTOL 200 UG/1
800 TABLET ORAL
Status: DISCONTINUED | OUTPATIENT
Start: 2021-08-10 | End: 2021-08-10

## 2021-08-10 RX ORDER — SIMETHICONE 80 MG
80 TABLET,CHEWABLE ORAL 4 TIMES DAILY PRN
Status: DISCONTINUED | OUTPATIENT
Start: 2021-08-10 | End: 2021-08-13 | Stop reason: HOSPADM

## 2021-08-10 RX ORDER — PROCHLORPERAZINE MALEATE 5 MG
10 TABLET ORAL EVERY 6 HOURS PRN
Status: DISCONTINUED | OUTPATIENT
Start: 2021-08-10 | End: 2021-08-13 | Stop reason: HOSPADM

## 2021-08-10 RX ORDER — TRANEXAMIC ACID 10 MG/ML
1 INJECTION, SOLUTION INTRAVENOUS EVERY 30 MIN PRN
Status: DISCONTINUED | OUTPATIENT
Start: 2021-08-10 | End: 2021-08-13 | Stop reason: HOSPADM

## 2021-08-10 RX ORDER — LABETALOL HYDROCHLORIDE 5 MG/ML
10 INJECTION, SOLUTION INTRAVENOUS
Status: DISCONTINUED | OUTPATIENT
Start: 2021-08-10 | End: 2021-08-10 | Stop reason: HOSPADM

## 2021-08-10 RX ORDER — NALOXONE HYDROCHLORIDE 0.4 MG/ML
0.4 INJECTION, SOLUTION INTRAMUSCULAR; INTRAVENOUS; SUBCUTANEOUS
Status: DISCONTINUED | OUTPATIENT
Start: 2021-08-10 | End: 2021-08-13 | Stop reason: HOSPADM

## 2021-08-10 RX ORDER — IBUPROFEN 800 MG/1
800 TABLET, FILM COATED ORAL EVERY 6 HOURS
Status: DISCONTINUED | OUTPATIENT
Start: 2021-08-11 | End: 2021-08-11

## 2021-08-10 RX ORDER — FENTANYL CITRATE-0.9 % NACL/PF 10 MCG/ML
PLASTIC BAG, INJECTION (ML) INTRAVENOUS CONTINUOUS PRN
Status: DISCONTINUED | OUTPATIENT
Start: 2021-08-10 | End: 2021-08-10

## 2021-08-10 RX ORDER — SODIUM CHLORIDE, SODIUM LACTATE, POTASSIUM CHLORIDE, CALCIUM CHLORIDE 600; 310; 30; 20 MG/100ML; MG/100ML; MG/100ML; MG/100ML
INJECTION, SOLUTION INTRAVENOUS CONTINUOUS
Status: DISCONTINUED | OUTPATIENT
Start: 2021-08-10 | End: 2021-08-10

## 2021-08-10 RX ORDER — AMOXICILLIN 250 MG
2 CAPSULE ORAL 2 TIMES DAILY
Status: DISCONTINUED | OUTPATIENT
Start: 2021-08-10 | End: 2021-08-13 | Stop reason: HOSPADM

## 2021-08-10 RX ORDER — ONDANSETRON 2 MG/ML
4 INJECTION INTRAMUSCULAR; INTRAVENOUS EVERY 30 MIN PRN
Status: DISCONTINUED | OUTPATIENT
Start: 2021-08-10 | End: 2021-08-10 | Stop reason: HOSPADM

## 2021-08-10 RX ORDER — OXYTOCIN/0.9 % SODIUM CHLORIDE 30/500 ML
340 PLASTIC BAG, INJECTION (ML) INTRAVENOUS CONTINUOUS PRN
Status: DISCONTINUED | OUTPATIENT
Start: 2021-08-10 | End: 2021-08-13 | Stop reason: HOSPADM

## 2021-08-10 RX ORDER — CEFAZOLIN SODIUM 2 G/100ML
2 INJECTION, SOLUTION INTRAVENOUS
Status: COMPLETED | OUTPATIENT
Start: 2021-08-10 | End: 2021-08-10

## 2021-08-10 RX ORDER — MISOPROSTOL 200 UG/1
400 TABLET ORAL
Status: DISCONTINUED | OUTPATIENT
Start: 2021-08-10 | End: 2021-08-13 | Stop reason: HOSPADM

## 2021-08-10 RX ORDER — METOCLOPRAMIDE 10 MG/1
10 TABLET ORAL EVERY 6 HOURS PRN
Status: DISCONTINUED | OUTPATIENT
Start: 2021-08-10 | End: 2021-08-13 | Stop reason: HOSPADM

## 2021-08-10 RX ORDER — NALOXONE HYDROCHLORIDE 0.4 MG/ML
0.2 INJECTION, SOLUTION INTRAMUSCULAR; INTRAVENOUS; SUBCUTANEOUS
Status: DISCONTINUED | OUTPATIENT
Start: 2021-08-10 | End: 2021-08-13 | Stop reason: HOSPADM

## 2021-08-10 RX ORDER — ONDANSETRON 4 MG/1
4 TABLET, ORALLY DISINTEGRATING ORAL EVERY 30 MIN PRN
Status: DISCONTINUED | OUTPATIENT
Start: 2021-08-10 | End: 2021-08-10 | Stop reason: HOSPADM

## 2021-08-10 RX ORDER — LIDOCAINE 40 MG/G
CREAM TOPICAL
Status: DISCONTINUED | OUTPATIENT
Start: 2021-08-10 | End: 2021-08-13 | Stop reason: HOSPADM

## 2021-08-10 RX ORDER — NALBUPHINE HYDROCHLORIDE 10 MG/ML
2.5-5 INJECTION, SOLUTION INTRAMUSCULAR; INTRAVENOUS; SUBCUTANEOUS EVERY 6 HOURS PRN
Status: DISCONTINUED | OUTPATIENT
Start: 2021-08-10 | End: 2021-08-13 | Stop reason: HOSPADM

## 2021-08-10 RX ORDER — LIDOCAINE 40 MG/G
CREAM TOPICAL
Status: DISCONTINUED | OUTPATIENT
Start: 2021-08-10 | End: 2021-08-10

## 2021-08-10 RX ORDER — CITRIC ACID/SODIUM CITRATE 334-500MG
30 SOLUTION, ORAL ORAL
Status: COMPLETED | OUTPATIENT
Start: 2021-08-10 | End: 2021-08-10

## 2021-08-10 RX ORDER — ACETAMINOPHEN 325 MG/1
975 TABLET ORAL ONCE
Status: COMPLETED | OUTPATIENT
Start: 2021-08-10 | End: 2021-08-10

## 2021-08-10 RX ORDER — MODIFIED LANOLIN
OINTMENT (GRAM) TOPICAL
Status: DISCONTINUED | OUTPATIENT
Start: 2021-08-10 | End: 2021-08-13 | Stop reason: HOSPADM

## 2021-08-10 RX ORDER — DEXTROSE, SODIUM CHLORIDE, SODIUM LACTATE, POTASSIUM CHLORIDE, AND CALCIUM CHLORIDE 5; .6; .31; .03; .02 G/100ML; G/100ML; G/100ML; G/100ML; G/100ML
INJECTION, SOLUTION INTRAVENOUS CONTINUOUS
Status: DISCONTINUED | OUTPATIENT
Start: 2021-08-10 | End: 2021-08-13 | Stop reason: HOSPADM

## 2021-08-10 RX ORDER — TERBUTALINE SULFATE 1 MG/ML
0.25 INJECTION, SOLUTION SUBCUTANEOUS ONCE
Status: DISCONTINUED | OUTPATIENT
Start: 2021-08-10 | End: 2021-08-10

## 2021-08-10 RX ORDER — HYDROXYZINE HYDROCHLORIDE 50 MG/1
100 TABLET, FILM COATED ORAL ONCE
Status: COMPLETED | OUTPATIENT
Start: 2021-08-10 | End: 2021-08-10

## 2021-08-10 RX ORDER — METHYLERGONOVINE MALEATE 0.2 MG/ML
200 INJECTION INTRAVENOUS
Status: DISCONTINUED | OUTPATIENT
Start: 2021-08-10 | End: 2021-08-10

## 2021-08-10 RX ORDER — OXYTOCIN 10 [USP'U]/ML
10 INJECTION, SOLUTION INTRAMUSCULAR; INTRAVENOUS
Status: DISCONTINUED | OUTPATIENT
Start: 2021-08-10 | End: 2021-08-10

## 2021-08-10 RX ORDER — MISOPROSTOL 200 UG/1
400 TABLET ORAL
Status: DISCONTINUED | OUTPATIENT
Start: 2021-08-10 | End: 2021-08-10

## 2021-08-10 RX ORDER — FENTANYL CITRATE 50 UG/ML
25-50 INJECTION, SOLUTION INTRAMUSCULAR; INTRAVENOUS EVERY 5 MIN PRN
Status: DISCONTINUED | OUTPATIENT
Start: 2021-08-10 | End: 2021-08-10 | Stop reason: HOSPADM

## 2021-08-10 RX ORDER — ACETAMINOPHEN 325 MG/1
975 TABLET ORAL EVERY 6 HOURS
Status: DISCONTINUED | OUTPATIENT
Start: 2021-08-10 | End: 2021-08-13 | Stop reason: HOSPADM

## 2021-08-10 RX ORDER — HYDROCORTISONE 2.5 %
CREAM (GRAM) TOPICAL 3 TIMES DAILY PRN
Status: DISCONTINUED | OUTPATIENT
Start: 2021-08-10 | End: 2021-08-13 | Stop reason: HOSPADM

## 2021-08-10 RX ORDER — MISOPROSTOL 200 UG/1
800 TABLET ORAL
Status: DISCONTINUED | OUTPATIENT
Start: 2021-08-10 | End: 2021-08-13 | Stop reason: HOSPADM

## 2021-08-10 RX ORDER — CEFAZOLIN SODIUM 2 G/100ML
2 INJECTION, SOLUTION INTRAVENOUS SEE ADMIN INSTRUCTIONS
Status: DISCONTINUED | OUTPATIENT
Start: 2021-08-10 | End: 2021-08-10

## 2021-08-10 RX ORDER — BISACODYL 10 MG
10 SUPPOSITORY, RECTAL RECTAL DAILY PRN
Status: DISCONTINUED | OUTPATIENT
Start: 2021-08-12 | End: 2021-08-13 | Stop reason: HOSPADM

## 2021-08-10 RX ORDER — ONDANSETRON 2 MG/ML
INJECTION INTRAMUSCULAR; INTRAVENOUS PRN
Status: DISCONTINUED | OUTPATIENT
Start: 2021-08-10 | End: 2021-08-10

## 2021-08-10 RX ORDER — OXYTOCIN 10 [USP'U]/ML
10 INJECTION, SOLUTION INTRAMUSCULAR; INTRAVENOUS
Status: DISCONTINUED | OUTPATIENT
Start: 2021-08-10 | End: 2021-08-13 | Stop reason: HOSPADM

## 2021-08-10 RX ORDER — METOCLOPRAMIDE HYDROCHLORIDE 5 MG/ML
10 INJECTION INTRAMUSCULAR; INTRAVENOUS EVERY 6 HOURS PRN
Status: DISCONTINUED | OUTPATIENT
Start: 2021-08-10 | End: 2021-08-13 | Stop reason: HOSPADM

## 2021-08-10 RX ADMIN — KETOROLAC TROMETHAMINE 30 MG: 30 INJECTION, SOLUTION INTRAMUSCULAR; INTRAVENOUS at 11:58

## 2021-08-10 RX ADMIN — CEFAZOLIN SODIUM 2 G: 2 INJECTION, SOLUTION INTRAVENOUS at 09:05

## 2021-08-10 RX ADMIN — TERBUTALINE SULFATE 0.25 MG: 1 INJECTION SUBCUTANEOUS at 00:06

## 2021-08-10 RX ADMIN — OXYTOCIN-SODIUM CHLORIDE 0.9% IV SOLN 30 UNIT/500ML 340 ML/HR: 30-0.9/5 SOLUTION at 09:38

## 2021-08-10 RX ADMIN — SODIUM CHLORIDE, POTASSIUM CHLORIDE, SODIUM LACTATE AND CALCIUM CHLORIDE: 600; 310; 30; 20 INJECTION, SOLUTION INTRAVENOUS at 09:48

## 2021-08-10 RX ADMIN — Medication 50 MCG/MIN: at 09:06

## 2021-08-10 RX ADMIN — NALBUPHINE HYDROCHLORIDE 5 MG: 10 INJECTION, SOLUTION INTRAMUSCULAR; INTRAVENOUS; SUBCUTANEOUS at 11:24

## 2021-08-10 RX ADMIN — SODIUM CHLORIDE, POTASSIUM CHLORIDE, SODIUM LACTATE AND CALCIUM CHLORIDE: 600; 310; 30; 20 INJECTION, SOLUTION INTRAVENOUS at 06:56

## 2021-08-10 RX ADMIN — SODIUM CITRATE AND CITRIC ACID MONOHYDRATE 30 ML: 500; 334 SOLUTION ORAL at 08:05

## 2021-08-10 RX ADMIN — ACETAMINOPHEN 975 MG: 325 TABLET, FILM COATED ORAL at 21:21

## 2021-08-10 RX ADMIN — ONDANSETRON 4 MG: 2 INJECTION INTRAMUSCULAR; INTRAVENOUS at 09:41

## 2021-08-10 RX ADMIN — SODIUM CHLORIDE, POTASSIUM CHLORIDE, SODIUM LACTATE AND CALCIUM CHLORIDE 1000 ML: 600; 310; 30; 20 INJECTION, SOLUTION INTRAVENOUS at 00:32

## 2021-08-10 RX ADMIN — KETOROLAC TROMETHAMINE 30 MG: 30 INJECTION, SOLUTION INTRAMUSCULAR; INTRAVENOUS at 18:07

## 2021-08-10 RX ADMIN — SODIUM CHLORIDE, SODIUM LACTATE, POTASSIUM CHLORIDE, CALCIUM CHLORIDE AND DEXTROSE MONOHYDRATE: 5; 600; 310; 30; 20 INJECTION, SOLUTION INTRAVENOUS at 13:37

## 2021-08-10 RX ADMIN — ACETAMINOPHEN 975 MG: 325 TABLET, FILM COATED ORAL at 08:06

## 2021-08-10 RX ADMIN — ACETAMINOPHEN 975 MG: 325 TABLET, FILM COATED ORAL at 14:34

## 2021-08-10 RX ADMIN — HYDROXYZINE HYDROCHLORIDE 100 MG: 50 TABLET, FILM COATED ORAL at 02:53

## 2021-08-10 RX ADMIN — DOCUSATE SODIUM AND SENNOSIDES 1 TABLET: 8.6; 5 TABLET, FILM COATED ORAL at 21:21

## 2021-08-10 ASSESSMENT — MIFFLIN-ST. JEOR: SCORE: 1720.6

## 2021-08-10 NOTE — PLAN OF CARE
Data: [facility] presented to [facility]: 2021 10:54 PM.  Reason for maternal/fetal assessment is uterine contractions. Patient reports feeling contractions starting at 2030 and feeling them mostly in her back and low abdomen.  Patient is a .  Prenatal record reviewed. Pregnancy has been uncomplicated..  Gestational Age 37w2d. VSS. Fetal movement active. Patient denies leaking of vaginal fluid/rupture of membranes, vaginal bleeding, abdominal pain, nausea, vomiting, headache, visual disturbances, epigastric or URQ pain, significant edema. Support person is present.   Action: Verbal consent for EFM. Triage assessment completed. Bill of rights reviewed.  Response: Patient verbalized agreement with plan. Will contact Dr Vera Artis with update and for further orders.

## 2021-08-10 NOTE — PLAN OF CARE
Pt transferred to [facility] from L & D at 1300 by Radha FAN RN. Pt very sleepy and asked to rest.  Brief room orientation completed.  Baby is in the NICU. Vital signs stable.   left to go home to other child.  Will continue to monitor.    Patients mobililty level scored using the bedside mobility assistance tool (BMAT). Patient is at a mobility level test number: 1. Mobility equipment used: hovermat. Required assist of 3 staff members. Further use of BMAT scoring required.    Care continued from 1500 - 1930:  Pt slept well and wakes up periodically.  is back at bedside.  Room orientation, educational materials and clipboard reviewed when awake.  Ipad set up for pt to view baby.  Vital signs remain stable. Pain managed with Tylenol and Toradol. Fundus firm, lochia minimal. Mehta draining clear urine, pt now able to move legs a little in bed, SCDs in place and working.  IV infusing.  at a bedside and supportive. Pt ordering some food and has been hydrating po.     Update:  Pt sleeping most of the afternoon.  Offered visit to NICU, pt will call after resting when she is ready.  Breast pump at bedside.  Pt said she plans to breast and bottle feed. Pt reports she was very 'stressed out' breastfeeding her first baby and has decided to breast and bottle feed to avoid feeling so much pressure this time.  Educated pt on pumping early and supply.   Instructed pt to call when ready to pump.

## 2021-08-10 NOTE — PLAN OF CARE
Pt transported to MB unit around 1300. VSS. Assessment WDL. Island dressing CDI. Mehta in place with adequate output. Oriented to room, plan to orient more once  returns. Tylenol for pain. Sleeping between cares. Baby in NICU.

## 2021-08-10 NOTE — PROVIDER NOTIFICATION
08/10/21 0222   Provider Notification   Provider Name/Title Dr. Artis   Method of Notification Phone     MD updated on patient status. 1 liter LR and terb given with no change in contraction status. Patient states more back pain and some pelvic pressure but relatively unchanged from when she was at home. Jordan still picking up contractions q2-5 minutes apart, palpating mild. SVE 2.5/60/-2 to -3, which is a slight change from earlier. Orders from MD to keep patient overnight for observation and to give 100 mg vistaril PO for sleep/pain. Will notify MD with any changes.

## 2021-08-10 NOTE — PROGRESS NOTES
"[facility] Labor and Delivery Progress Note    Elayne Burciaga MRN# [id number]   Age: 31 year old YOB: 1990           Subjective:     31 y  admitted overnight for observation for rule out labor. History significant for previous  and plans repeat  and tubal ligation. Prenatal  Significant for obesity.  Pt received IVF and terb and contractions less frequent and unchangde. Recommend observation overnight - as pt has hx 37+wk delivery.          Objective:   Patient Vitals for the past 8 hrs:   BP Temp Temp src Resp Height Weight   08/10/21 0716 121/77 98.8  F (37.1  C) Oral 16 1.575 m (5' 2\") 105.2 kg (232 lb)   08/10/21 0240 113/66 97.9  F (36.6  C) Oral 18 -- --        Cervical Exam:  2/60%/-3 - vtx high  Membranes: Intact     Fetal Heart Rate:    Monitor:   ext   Variability:      Baseline Rate:      Fetal Heart Rate Tracing: cat 1    COVID sent        Assessment:   1)  IUP at  37w2d   2)  Hx previous  - plans repeat   3) rule out labor        Plan:   Po vistaril for discomfort   Continuous monitoring       Vera Artis MD        "

## 2021-08-10 NOTE — OP NOTE
Procedure Date: 08/10/2021    POSTOPERATIVE DIAGNOSES:     1.  Intrauterine pregnancy at 37+2 weeks.  2.  History of previous  section, desires repeat  section.  3.  Labor.  4.  Desires permanent sterilization.    POSTOPERATIVE DIAGNOSES:  1.  Intrauterine pregnancy at 37+2 weeks.  2.  History of previous  section, desires repeat  section.  3.  Labor.  4.  Desires permanent sterilization.  5.  Delivered.     PROCEDURES:  1.  Repeat low transverse  section.  2.  Bilateral salpingectomy.    SURGEON:  Mena Park MD    ANESTHESIA:  Spinal with Duramorph.    QUANTITATIVE BLOOD LOSS:  430 mL.    COMPLICATIONS:  None.    FINDINGS:  There were dense adhesions of the abdominal muscles to the fascia.  The bladder was adhesed to the lower uterine segment.  Upon entering the abdomen, the uterus, fallopian tubes bilaterally and ovaries bilaterally were within normal limits.  Upon entering the uterus, amniotic fluid was clear.  Baby was found in the vertex presentation and delivered without difficulty at 9:37 a.m.  Baby was a female weighing 6 pounds 5 ounces.  She was vigorous immediately following delivery and delayed cord clamping for 1 minute was done.  After 1 minute, the cord was clamped and cut and the baby handed to the nurses.  Apgar at 1 minute was 8.  Baby's Apgar at 5 minutes was 3 and at 10 minutes was 5.  The baby received CPAP and the  Intensive Care Unit team was called.  Baby was taken to the NICU for further evaluation.    INDICATIONS:  This patient is a 31-year-old G3, P2-0-0-2, at 37+2 weeks, who presented to Labor and Delivery with contractions at approximately 11:15 p.m. on 2021.  On arrival, her cervix was 1-2 cm/40% to 50% -2 station.  Fetal heart tones were reassuring.  The patient was stephen every 2-4 minutes.  The patient initially received a liter of LR over 2 hours and 1 dose of terbutaline.  The contractions continued and the patient  complained of some pelvic pressure with her contractions as well.  Cervix was rechecked and was 2-3 cm/60%/-2 to -3 station at approximately 2 a.m.  Plan was made to continue observation.  The patient received Vistaril 100 mg p.o. for pain and sleep.  At 5 a.m., the patient was still having contractions and felt that they were getting stronger.  RN checked cervix and it was 3/60 percent/-2 to -3 station.  Plan was to continue monitoring.  At 7:15 a.m., the patient was checked by Dr. Artis, who was caring for her.  At that time, she was felt to be 2 cm/60%/-3 station.  Contractions continued and remained every 2-4 minutes and were uncomfortable.  Plan was made to proceed with  section.  I took over care of the patient at 8 a.m.  I discussed the situation with the patient, including continued contractions, small amount of cervical change, and early labor.  It was recommended to her to proceed with a repeat  section due to early labor at 37 plus weeks' gestation.  The risks and benefits of this were discussed with her in detail, including risks of bleeding, infection, possible need for blood transfusion, and damage to adjacent organs, including the bowel, bladder, and ureters.  The patient also requested a postpartum tubal ligation during her pregnancy.  We discussed bilateral salpingectomy.  We discussed additional risks of regret and failure resulting in pregnancy with an increased risk of ectopic pregnancy.  The patient did wish to proceed and consent was obtained.    DESCRIPTION OF PROCEDURE:  The patient was taken to the operating room.  Spinal anesthesia was placed.  She was placed on the operating table in dorsal supine position with a leftward tilt.  Mehta catheter was placed in the bladder.  She was prepped and draped in the usual sterile fashion.  Timeout was performed.  Anesthesia was found to be adequate.  Pfannenstiel incision was made with a scalpel through the skin.  This was carried  down to fascia with cautery.  Fascia was incised in the midline.  Fascial incision was extended laterally in both directions.  Rectus muscles were dissected off the fascia.  Rectus muscles were  in the midline.  Peritoneum was identified and entered.  Peritoneal incision was extended superiorly and inferiorly with good visualization of the bladder.  Joss retractor was placed.  Vesicouterine peritoneum was identified and entered.  An incision created laterally in both directions.  Bladder flap created digitally.  A transverse incision was made in the lower uterine segment with a scalpel.  Fluid was clear.  Incision was extended laterally in both directions bluntly.  Baby was found in the vertex presentation and delivered without difficulty at 9:37 a.m.  Delayed cord clamping was done for 1 minute.  After this, the cord was clamped and cut and the baby handed to the nurses.  Please see above for details.  Placenta was then manually delivered and the uterus cleared of all clots and debris.  Uterine incision was closed with 0 Vicryl in a running, locked fashion.  A second layer of the same suture was used to imbricate the incision.  Incision was hemostatic.  Uterus was then removed from the abdomen to perform the bilateral salpingectomy.  The patient confirmed she wished to proceed.  The right fallopian tube was grasped with a Wyatt clamp.  The handheld LigaSure device was used to cauterize across the mesosalpinx just below the fallopian tube to the level of the cornua.  At this point, the fallopian tube was cauterized and transected.  In a similar fashion, the left fallopian tube was grasped with a Wyatt clamp.  Again, the LigaSure device was used to cauterize and transect tissue across the mesosalpinx just below the fallopian tube to the level of the cornua.  At the cornua, the fallopian tube was cauterized and transected.  The fallopian tubes were sent to Pathology.  Hemostasis was assured.  Uterus was  returned to the abdomen.  The abdomen was irrigated.  Hemostasis at the uterine incision was ensured.  The Joss retractor was removed.  The peritoneum was then closed with 3-0 Vicryl in a running fashion.  Rectus muscles were inspected and hemostasis assured.  The fascia was closed with 0 Vicryl in a running fashion.  Subcutaneous tissues were inspected and hemostasis assured with cautery.  The skin was closed with 4-0 Monocryl in a subcuticular fashion.  Steri-Strips and a bandage were placed.    The patient tolerated the procedure well.  Counts were correct.  She was taken to the recovery room in stable condition.  Baby was taken to the NICU for further evaluation.    Mena Park MD        D: 08/10/2021   T: 08/10/2021   MT: [id number]    Name:     CLARICE DUNHAM  MRN:      [id number]        Account:        [id number]   :      1990           Procedure Date: 08/10/2021     Document: [id number]

## 2021-08-10 NOTE — ANESTHESIA CARE TRANSFER NOTE
Patient: May Earlene Burciaga    Procedure(s):  repeat  SECTION, WITH BILATERAL SALPINGECTOMY    Diagnosis: Encounter for maternal care for low transverse scar from repeat  delivery [O34.211]  Sterilization [Z30.2]  Diagnosis Additional Information: No value filed.    Anesthesia Type:   Spinal     Note:    Oropharynx: oropharynx clear of all foreign objects and spontaneously breathing  Level of Consciousness: awake  Oxygen Supplementation: room air    Independent Airway: airway patency satisfactory and stable  Dentition: dentition unchanged  Vital Signs Stable: post-procedure vital signs reviewed and stable  Report to RN Given: handoff report given  Patient transferred to: PACU    Handoff Report: Identifed the Patient, Identified the Reponsible Provider, Reviewed the pertinent medical history, Discussed the surgical course, Reviewed Intra-OP anesthesia mangement and issues during anesthesia, Set expectations for post-procedure period and Allowed opportunity for questions and acknowledgement of understanding      Vitals:  Vitals Value Taken Time   /63 08/10/21 1036   Temp     Pulse     Resp     SpO2 99 % 08/10/21 1036   Vitals shown include unvalidated device data.    Electronically Signed By: BARBARA Guido CRNA  August 10, 2021  10:39 AM

## 2021-08-10 NOTE — PROVIDER NOTIFICATION
[id number]   Provider Notification   Provider Name/Title Dr. Artis   Method of Notification Phone   Request Evaluate - Remote   Notification Reason Patient Arrived     MD notified of patient arrival.  at 37&1 weeks, here for rule out labor. History of a  with her last baby for failure to progress. She reports feeling contractions beginning around , described as tightening and sharp, vaginal pressure. Denies LOF and vaginal bleeding. Pinckard picking up contractions about q2-4 minutes, palpating mild. SVE 1.5/50/-3. FHR moderate but difficult to keep continuous due to active fetus and larger abdomen; will continue to adjust. Verbal orders from MD for 1 liter of LR over 2 hours, a dose of terbutaline, and to recheck her cervix in 2 hours. If no change in cervix and contractions decrease, orders to discharge patient home. Will notify MD if anything changes.

## 2021-08-10 NOTE — PROVIDER NOTIFICATION
08/10/21 0520   Provider Notification   Provider Name/Title Dr. Artis   Method of Notification Phone     MD updated on patient status. Vistaril given & patient said it helped a little bit but not much & is still stephen q2-4 minutes, palpating mild. She states that her contractions are mostly in her back, radiating down to her bottom and getting a little stronger. SVE slightly changed at 3/60/-2 to -3. MD would like to watch her for another hour but would like her to be NPO and to put in pre-op orders.

## 2021-08-10 NOTE — H&P
May Earlene Burciaga  [id number]  OB Admit History & Physical      HPI:  Ms. Burciaga  is a 31 year old  @ 37w2d  who presented to L&D with contractions.      Prenatal course:  MARCUS at  24 weeks, regular care.    Pregnancy complications:  1. H/O previous , desires repeat  and tubal ligation  2. Obesity  3. H/O PP depression    Prenatal labs:  O POS, antibody screen negative,  Rubella Immune, Hep B/HIV/RPR all negative, GC/CT negative, ,  GBS neg;      OB history:   OB History    Para Term  AB Living   3 2 2 0 0 0   SAB TAB Ectopic Multiple Live Births   0 0 0 0 2      # Outcome Date GA Lbr Gorge/2nd Weight Sex Delivery Anes PTL Lv   3 Current            2 Term 2018 37   F LTCS      1 Term 2012 38   M  - SURROGATE            PMHx:   History reviewed. No pertinent past medical history.    PSHX:    Past Surgical History:   Procedure Laterality Date      SECTION  2018    second pregnancy, failure to progress           OVARIAN CYST REMOVAL  2011      BREAST CORE BIOPSY LEFT Left 2016       Meds:    PNV       Allergies: Codeine      REVIEW OF SYSTEMS:  Positives and negatives in HPI.     SocHx:    Social History     Socioeconomic History     Marital status:      Spouse name: Not on file     Number of children: Not on file     Years of education: Not on file     Highest education level: Not on file   Occupational History     Not on file   Tobacco Use     Smoking status: Never Smoker     Smokeless tobacco: Never Used   Substance and Sexual Activity     Alcohol use: Not Currently     Drug use: Never     Sexual activity: Yes     Partners: Male   Other Topics Concern     Not on file   Social History Narrative     Not on file     Social Determinants of Health     Financial Resource Strain:      Difficulty of Paying Living Expenses:    Food Insecurity:      Worried About Running Out of Food in the Last Year:      Ran Out of Food in the Last Year:   "  Transportation Needs:      Lack of Transportation (Medical):      Lack of Transportation (Non-Medical):    Physical Activity:      Days of Exercise per Week:      Minutes of Exercise per Session:    Stress:      Feeling of Stress :    Social Connections:      Frequency of Communication with Friends and Family:      Frequency of Social Gatherings with Friends and Family:      Attends [employer] Services:      Active Member of Clubs or Organizations:      Attends Club or Organization Meetings:      Marital Status:    Intimate Partner Violence:      Fear of Current or Ex-Partner:      Emotionally Abused:      Physically Abused:      Sexually Abused:         Fam Hx:    Family History   Problem Relation Age of Onset     Breast Cancer Mother      Cervical Cancer Mother      Diabetes Father      Thyroid Disease Sister      Asthma Brother      Hyperlipidemia Maternal Grandmother      Deep Vein Thrombosis Maternal Grandmother      No Known Problems Sister          PHYSICAL EXAM:      Vitals:  /77   Temp 98.8  F (37.1  C) (Oral)   Resp 16   Ht 1.575 m (5' 2\")   Wt 105.2 kg (232 lb)   BMI 42.43 kg/m    Alert Awake in NAD  ABD gravid, non-tender  Cervix:  2 cm / 60 % effaced at -3 station per Dr. Artis at 7:15 am (was 1-2/40/-3 at 11 pm per RN and 3/60/-3 at 5 am per RN)  , membranes intact,   EFM:  Baseline 130, with moderate variability, accels to present, no decels  Cora: contractions q3-4 min    Assessment:  IUP at 37w2d admitted for early labor, planning repeat .    Plan:  1. Early labor - patient continues to have painful ctx q 3-4 min overnight despite rest, IV hydration, terb x 1 and vistaril.  Cervix has made change.  Recommend proceeding with repeat  for labor, h/o previous .  Patient declines TOLAC.  2. GBS neg  3. Patient desires permanent sterilization - plans bilateral salpingectomy.  4. Discussed risks and benefits of repeat  and bilateral salpingectomy including " risks of bleeding, possible need for blood transfusion, infection and damage to adjacent organs including bowel, bladder and ureters.  Discussed removing fallopian tubes as well and risks of regret and failure resulting in pregnancy with increased risk for ectopic pregnancy.  Consent obtained.        Mena Park MD MD  Dept of OB/GYN  August 10, 2021

## 2021-08-10 NOTE — BRIEF OP NOTE
OB  Brief Operative Note    1.  Pre-op diagnosis-  31 year old  IUP at 37w2d                            H/O previous , desires repeat      Labor     Desires permanent sterilization    2.  Post-op diagnosis-  Same                                       Delivered    3.  Procedure- Repeat Low Transverse  Section via Pfannenstiel skin incision with 2-layer uterine closure    Bilateral salpingectomy    4.  Surgeon- Mena Park MD MD    5.  Assistant- none    6.  QBL- 430 ml    7.  Fluids-  See anesthesia record    8.  UOP-  See anesthesia record      9.  Complications- None apparent     10.  Anesthesia- Spinal with duramorph    11.  Findings-  Viable Female infant delivered on 8/10/2021 at 937 am  from vertex presentation.  Apgars 8 at one minute and 3 at five minutes and 5 at 10 min.  Weight 6 lbs 5 oz.  Clear amniotic fluid  Placenta appeared grossly normal.  Normal appearing uterus, tubes and ovaries.  Baby to NICU.    Mena Park MD   8/10/2021  10:38 AM

## 2021-08-10 NOTE — ANESTHESIA POSTPROCEDURE EVALUATION
Patient: May Earlene Burciaga    Procedure(s):  repeat  SECTION, WITH BILATERAL SALPINGECTOMY    Diagnosis:Encounter for maternal care for low transverse scar from repeat  delivery [O34.211]  Sterilization [Z30.2]  Diagnosis Additional Information: No value filed.    Anesthesia Type:  Spinal    Note:  Disposition: Inpatient   Postop Pain Control: Uneventful            Sign Out: Well controlled pain   PONV: No   Neuro/Psych: Uneventful            Sign Out: Acceptable/Baseline neuro status   Airway/Respiratory: Uneventful            Sign Out: Acceptable/Baseline resp. status   CV/Hemodynamics: Uneventful            Sign Out: Acceptable CV status; No obvious hypovolemia; No obvious fluid overload   Other NRE: NONE   DID A NON-ROUTINE EVENT OCCUR? No    Event details/Postop Comments:  Block resolving. Pt comfortable and VSS.            Last vitals:  Vitals Value Taken Time   /69 08/10/21 1123   Temp 97.7  F (36.5  C) 08/10/21 1050   Pulse     Resp 15 08/10/21 1050   SpO2 100 % 08/10/21 1121   Vitals shown include unvalidated device data.    Electronically Signed By: Laureen Davis MD  August 10, 2021  11:25 AM

## 2021-08-11 ENCOUNTER — ANESTHESIA EVENT (OUTPATIENT)
Dept: OBGYN | Facility: CLINIC | Age: 31
End: 2021-08-11

## 2021-08-11 ENCOUNTER — ANESTHESIA (OUTPATIENT)
Dept: OBGYN | Facility: CLINIC | Age: 31
End: 2021-08-11
Payer: COMMERCIAL

## 2021-08-11 LAB
HGB BLD-MCNC: 8.5 G/DL (ref 11.7–15.7)
PATH REPORT.COMMENTS IMP SPEC: NORMAL
PATH REPORT.COMMENTS IMP SPEC: NORMAL
PATH REPORT.FINAL DX SPEC: NORMAL
PATH REPORT.GROSS SPEC: NORMAL
PATH REPORT.MICROSCOPIC SPEC OTHER STN: NORMAL
PATH REPORT.RELEVANT HX SPEC: NORMAL
PHOTO IMAGE: NORMAL

## 2021-08-11 PROCEDURE — 88302 TISSUE EXAM BY PATHOLOGIST: CPT | Mod: 26 | Performed by: PATHOLOGY

## 2021-08-11 PROCEDURE — 36415 COLL VENOUS BLD VENIPUNCTURE: CPT | Performed by: OBSTETRICS & GYNECOLOGY

## 2021-08-11 PROCEDURE — 120N000001 HC R&B MED SURG/OB

## 2021-08-11 PROCEDURE — 85018 HEMOGLOBIN: CPT | Performed by: OBSTETRICS & GYNECOLOGY

## 2021-08-11 PROCEDURE — 250N000013 HC RX MED GY IP 250 OP 250 PS 637: Performed by: ANESTHESIOLOGY

## 2021-08-11 PROCEDURE — 250N000011 HC RX IP 250 OP 636: Performed by: OBSTETRICS & GYNECOLOGY

## 2021-08-11 PROCEDURE — 250N000013 HC RX MED GY IP 250 OP 250 PS 637: Performed by: OBSTETRICS & GYNECOLOGY

## 2021-08-11 RX ORDER — IBUPROFEN 800 MG/1
800 TABLET, FILM COATED ORAL EVERY 6 HOURS
Status: DISCONTINUED | OUTPATIENT
Start: 2021-08-11 | End: 2021-08-13 | Stop reason: HOSPADM

## 2021-08-11 RX ADMIN — ACETAMINOPHEN 975 MG: 325 TABLET, FILM COATED ORAL at 22:25

## 2021-08-11 RX ADMIN — IBUPROFEN 800 MG: 800 TABLET, FILM COATED ORAL at 06:28

## 2021-08-11 RX ADMIN — DOCUSATE SODIUM AND SENNOSIDES 1 TABLET: 8.6; 5 TABLET, FILM COATED ORAL at 22:25

## 2021-08-11 RX ADMIN — ACETAMINOPHEN 975 MG: 325 TABLET, FILM COATED ORAL at 09:31

## 2021-08-11 RX ADMIN — ACETAMINOPHEN 975 MG: 325 TABLET, FILM COATED ORAL at 03:15

## 2021-08-11 RX ADMIN — PRENATAL VITAMINS-IRON FUMARATE 27 MG IRON-FOLIC ACID 0.8 MG TABLET 1 TABLET: at 09:30

## 2021-08-11 RX ADMIN — DOCUSATE SODIUM AND SENNOSIDES 1 TABLET: 8.6; 5 TABLET, FILM COATED ORAL at 09:34

## 2021-08-11 RX ADMIN — OXYCODONE HYDROCHLORIDE 5 MG: 5 TABLET ORAL at 20:09

## 2021-08-11 RX ADMIN — IBUPROFEN 800 MG: 800 TABLET, FILM COATED ORAL at 13:26

## 2021-08-11 RX ADMIN — KETOROLAC TROMETHAMINE 30 MG: 30 INJECTION, SOLUTION INTRAMUSCULAR; INTRAVENOUS at 00:37

## 2021-08-11 RX ADMIN — ACETAMINOPHEN 975 MG: 325 TABLET, FILM COATED ORAL at 15:46

## 2021-08-11 RX ADMIN — IBUPROFEN 800 MG: 800 TABLET, FILM COATED ORAL at 20:09

## 2021-08-11 RX ADMIN — OXYCODONE HYDROCHLORIDE 5 MG: 5 TABLET ORAL at 09:34

## 2021-08-11 NOTE — PLAN OF CARE
VSS. Assessment WNL. Has voided with good output since kathleen removal and will attempt again this AM. Encouraging fluid intake. Tolerating food and liquids well; no nausea reported. Up walking independently in the room. Pain managed well with toradol and tylenol. Incision covered by island dressing; dressing marked and has not changed during shift. Partner at bedside; very helpful and supportive to patient. Down to see baby in the NICU earlier during shift. Baby on iPad in room. Pumping. Encouraged to call for help as needed. Will continue with plan of care.     Patients mobililty level scored using the bedside mobility assistance tool (BMAT). Patient is at a mobility level test number: 2. Mobility equipment used: none required. Required assist of 0 staff members. Further use of BMAT scoring not required.

## 2021-08-11 NOTE — PLAN OF CARE
VSS. Taking ibuprofen, tylenol and occasional oxycodone for pain control. Pt is ambulating to bathroom and voiding without difficulty. Visiting baby in NICU. FOB at bedside and supportive. Meeting other expected goals, will continue to monitor.

## 2021-08-11 NOTE — ANESTHESIA PREPROCEDURE EVALUATION
Anesthesia Pre-Procedure Evaluation    Patient: May Earlene Burciaga   MRN: [id number] : 1990        Preoperative Diagnosis: Encounter for maternal care for low transverse scar from repeat  delivery [O34.211]  Sterilization [Z30.2]   Procedure : Procedure(s):  repeat  SECTION, WITH BILATERAL SALPINGECTOMY     History reviewed. No pertinent past medical history.   Past Surgical History:   Procedure Laterality Date      SECTION  2018    second pregnancy, failure to progress     COMBINED  SECTION, SALPINGECTOMY BILATERAL Bilateral 8/10/2021    Procedure: repeat  SECTION, WITH BILATERAL SALPINGECTOMY;  Surgeon: Mena Park MD;  Location: [facility] L+D     GYN SURGERY       OVARIAN CYST REMOVAL  2011      BREAST CORE BIOPSY LEFT Left 2016      Allergies   Allergen Reactions     Codeine       Social History     Tobacco Use     Smoking status: Never Smoker     Smokeless tobacco: Never Used   Substance Use Topics     Alcohol use: Not Currently      Wt Readings from Last 1 Encounters:   08/10/21 105.2 kg (232 lb)              OUTSIDE LABS:  CBC:   Lab Results   Component Value Date    WBC 10.1 2020    WBC 8.0 2018    HGB 8.5 (L) 2021    HGB 9.9 (L) 08/10/2021    HCT 36.7 2020    HCT 37.2 2018     2020     2018     BMP:   Lab Results   Component Value Date     2020    POTASSIUM 3.7 2020    CHLORIDE 107 2020    CO2 27 2020    BUN 17 2020    CR 0.88 2020    GLC 99 2020     COAGS: No results found for: PTT, INR, FIBR  POC:   Lab Results   Component Value Date    HCG Negative 2018    HCGS Negative 2020     HEPATIC:   Lab Results   Component Value Date    ALBUMIN 2.8 (L) 2020    PROTTOTAL 6.8 2020    ALT 20 2020    AST 14 2020    ALKPHOS 75 2020    BILITOTAL 0.1 (L) 2020     OTHER:   Lab Results   Component Value Date    KATY 8.7  12/13/2020    LIPASE 59 (L) 12/13/2020    TSH 1.82 12/14/2018               Laureen Davis MD

## 2021-08-11 NOTE — CONSULTS
INITIAL SOCIAL WORK NICU ASSESSMENT      DATA:      Reason for Social Work Consult: baby in the NICU    Living Situation: home with BEENA, May, ISHA, Ozzy, & sibling Lucina (3 yo)     Social Support: BEENA's sister is supportive & involved. She is helping to care for Lucina while May is in the hospital.      Employment: BEENA is currently employed part time & is able to take as much time off as she needs per her employer. ISHA is employed full time & plans to take 5 days off. He then will work from home until sometime in October.      Insurance: commercial     Source of Financial Support: BEENA & ISHA's employment     Mental Health History: prior postpartum depression     History of Postpartum Mood Disorders: hx of postpartum depression for approximately 2 months after first child, Lucina was born     Chemical Health History: none     INTERVENTION:        JORDI completed chart review and collaborated with the multidisciplinary team.     Psychosocial Assessment     Introduction to NICU  role and scope of practice     Discussed NICU experience and gave NICU welcome card    Reviewed Hospital and Community Resources     Assessed Chemical Health History and Current Symptoms     Assessed Mental Health History and Current Symptoms     Identified stressors, barriers and family concerns     Provided support and active empathetic listening and validation.     Provided psychoeducation on  mood and anxiety disorders, assessed for any current symptoms or history    ASSESSMENT:      Coping: adequate     Affect: appropriate    Mood: calm     Motivation/Ability to Access Services: Pt's able to access needed resources for support if needed. She denied needing any additional resources or support at this time. She shared she may look into a therapist in case it is needed in the future & was provided information on anxiety & depression postpartum.     Assessment of Support System:  stable, adequate,      Level of  engagement with SW: engaged and appropriate. BEENA voiced appreciation for visit & openness to weekly check in's.      Family's understanding of baby's medical situation:  appropriate understanding     Family and parent/infant interactions: Parents are supportive of each other & watching baby via IPAD throughout the visit.     Assessment of parental risk for PMAD: Higher than average risk given unexpected NICU admission & prior postpartum depression.      Strengths: caring family & willingness to accept help        Identified Barriers: None identified at this time     PLAN:      MAYE met with Elayne HARGROVE & Ozzy VIEIRA to introduce care management. They voiced they have everything they need for baby at home including a car seat, crib, diapers, & clothes. May voiced she has good family support especially through her younger sister. She shared she had postpartum depression after her first baby related to the stress with breast feeding. She voiced she was feeling much for supported this time & like she has a better understanding of what to do this time. May denied any current emotional concerns but voiced understanding and willingness to reach out if any additional support is needed. May & Ozzy both agreeable to weekly check in's from care management while Trinity chong is in the NICU.   iLsa Hogan, [facility]  8/11/2021  11:30 AM

## 2021-08-11 NOTE — PLAN OF CARE
Late entry:  Hand off was given to Carolina THORNTON AT 1210 to follow.  VSS and fundus was firm at transition.  SCD were in place  & pain was well controlled.  Patient was able to move her legs at transition.  Patient was transported to the NICU by Radha FAN RN to see infant and then to postpartum.

## 2021-08-11 NOTE — PROGRESS NOTES
[facility]   Obstetrics Post-Op / Progress Note         Assessment and Plan:    Assessment:   Post-operative day #1  Low transverse repeat  section  Post-partum tubal ligation  L&D complications: Hx  and early labor      Doing well.  Hbg 8.5      Plan:   Ambulation encouraged  Routine post op cares            Interval History:   Pt in NICU with baby. Per RN doing well and without complaints.          Significant Problems:    None          Review of Systems:   Voiding without difficulty. Lochia normal. Ambulating. Tolerating PO. Pain well controlled. BreastfedThe patient denies any chest pain, shortness of breath, excessive pain, fever, chills, purulent drainage from the wound, nausea or vomiting.          Physical Exam:   All vitals stable  Wound clean and dry with minimal or no drainage.  Surrounding skin with minimal erythema.          Data:   All laboratory data related to this surgery reviewed    Vera Artis MD

## 2021-08-12 PROCEDURE — 250N000013 HC RX MED GY IP 250 OP 250 PS 637: Performed by: ANESTHESIOLOGY

## 2021-08-12 PROCEDURE — 250N000013 HC RX MED GY IP 250 OP 250 PS 637: Performed by: OBSTETRICS & GYNECOLOGY

## 2021-08-12 PROCEDURE — 120N000001 HC R&B MED SURG/OB

## 2021-08-12 RX ORDER — FERROUS SULFATE 325(65) MG
325 TABLET ORAL DAILY
Status: DISCONTINUED | OUTPATIENT
Start: 2021-08-12 | End: 2021-08-13 | Stop reason: HOSPADM

## 2021-08-12 RX ADMIN — OXYCODONE HYDROCHLORIDE 5 MG: 5 TABLET ORAL at 00:20

## 2021-08-12 RX ADMIN — OXYCODONE HYDROCHLORIDE 5 MG: 5 TABLET ORAL at 18:51

## 2021-08-12 RX ADMIN — IBUPROFEN 800 MG: 800 TABLET, FILM COATED ORAL at 08:21

## 2021-08-12 RX ADMIN — OXYCODONE HYDROCHLORIDE 5 MG: 5 TABLET ORAL at 10:27

## 2021-08-12 RX ADMIN — FERROUS SULFATE TAB 325 MG (65 MG ELEMENTAL FE) 325 MG: 325 (65 FE) TAB at 10:27

## 2021-08-12 RX ADMIN — OXYCODONE HYDROCHLORIDE 5 MG: 5 TABLET ORAL at 06:28

## 2021-08-12 RX ADMIN — PRENATAL VITAMINS-IRON FUMARATE 27 MG IRON-FOLIC ACID 0.8 MG TABLET 1 TABLET: at 08:21

## 2021-08-12 RX ADMIN — DOCUSATE SODIUM AND SENNOSIDES 1 TABLET: 8.6; 5 TABLET, FILM COATED ORAL at 19:59

## 2021-08-12 RX ADMIN — ACETAMINOPHEN 975 MG: 325 TABLET, FILM COATED ORAL at 06:28

## 2021-08-12 RX ADMIN — IBUPROFEN 800 MG: 800 TABLET, FILM COATED ORAL at 14:32

## 2021-08-12 RX ADMIN — ACETAMINOPHEN 975 MG: 325 TABLET, FILM COATED ORAL at 18:51

## 2021-08-12 RX ADMIN — ACETAMINOPHEN 975 MG: 325 TABLET, FILM COATED ORAL at 12:27

## 2021-08-12 RX ADMIN — OXYCODONE HYDROCHLORIDE 5 MG: 5 TABLET ORAL at 14:31

## 2021-08-12 RX ADMIN — IBUPROFEN 800 MG: 800 TABLET, FILM COATED ORAL at 01:55

## 2021-08-12 RX ADMIN — IBUPROFEN 800 MG: 800 TABLET, FILM COATED ORAL at 19:59

## 2021-08-12 NOTE — PLAN OF CARE
Patient VSS. Incision is well approximated and steri strips clean, dry, and intact. Ambulating and independent with cares. Urine output adequate. Pain is well managed with scheduled Tylenol, Ibuprofen, and PRN Oxycodone. Pumping for infant in NICU and visiting frequently. IPAD in room.

## 2021-08-12 NOTE — PLAN OF CARE
Assumed care at 1900: VSS. Pumping and tolerating well. Scant amount of lochia, no clots noted. Incision covered with steri strips, CDI. Tylenol, Ibuprofen, and oxycodone for pain control. NICU IPad in room and visiting infant in NICU, speaking positively of infant. Significant other at bedside and supportive. Continue with plan of care.

## 2021-08-12 NOTE — PLAN OF CARE
Pt up and derick. Voiding without difficulty. Bonding well with baby in NICU and making several visits. Fundus firm and midline. Incision site covered with steri-stripes with dried drainage noted, education completed. Tylenol, ibuprofen, and oxy effective for pain management. Pumping in room. Abdominal binder given for additional relief. BC and PPD completed, education completed as patient has mast prior post-partum  Depression - expressed she will start to see a therapist upon discharge to get ahead of it. Would like the Medela breast pump prior to discharge per patient.  Education completed. Anticipate discharge tomorrow.    Antoinette Vieyra RN

## 2021-08-12 NOTE — PROGRESS NOTES
"OB Post-op  Section Progress Note POD#2    S:  Patient doing well.  Pain is well controlled.  Ambulating.  Tolerating regular diet.  No N/V.  Passing flatus.  Voiding.  Bleeding is normal.  Breastfeeding.    O:  /74   Pulse 73   Temp 99  F (37.2  C) (Oral)   Resp 16   Ht 1.575 m (5' 2\")   Wt 105.2 kg (232 lb)   SpO2 100%   Breastfeeding Unknown   BMI 42.43 kg/m    Gen- A&O, NAD  Abd- soft, non-tender, no rebound or guarding, fundus firm at umbilicus  Incision- C/D/I  Ext- non-tender, no edema. Pneumoboots in place lower extremities    Hemoglobin   Date Value Ref Range Status   2021 8.5 (L) 11.7 - 15.7 g/dL Final   2020 11.9 11.7 - 15.7 g/dL Final     O POS   Rubella immune    A/P:  31 year old  POD#2 s/p RLTCS and bilateral salpingectomy at 37 weeks due to labor and desire for permanent sterilization.    -  Routine post-op cares  - Rh pos, Rubella immune  - Pumping for baby in NICU  - Acute blood loss anemia, asymptomatic. Hgb 8.5 mL.  Will supplement with PO iron.    Dispo:  Anticipate d/c home on POD#3    Audra Omalley MD  2021  9:04 AM     "

## 2021-08-13 VITALS
HEART RATE: 73 BPM | SYSTOLIC BLOOD PRESSURE: 127 MMHG | RESPIRATION RATE: 18 BRPM | DIASTOLIC BLOOD PRESSURE: 72 MMHG | OXYGEN SATURATION: 100 % | WEIGHT: 232 LBS | HEIGHT: 62 IN | TEMPERATURE: 98 F | BODY MASS INDEX: 42.69 KG/M2

## 2021-08-13 PROCEDURE — 250N000013 HC RX MED GY IP 250 OP 250 PS 637: Performed by: OBSTETRICS & GYNECOLOGY

## 2021-08-13 PROCEDURE — 250N000013 HC RX MED GY IP 250 OP 250 PS 637: Performed by: ANESTHESIOLOGY

## 2021-08-13 RX ORDER — PRENATAL VIT/IRON FUM/FOLIC AC 27MG-0.8MG
1 TABLET ORAL DAILY
Qty: 90 TABLET | Refills: 0 | Status: SHIPPED | OUTPATIENT
Start: 2021-08-13

## 2021-08-13 RX ORDER — AMOXICILLIN 250 MG
1 CAPSULE ORAL 2 TIMES DAILY PRN
Qty: 30 TABLET | Refills: 0 | Status: SHIPPED | OUTPATIENT
Start: 2021-08-13 | End: 2021-10-18

## 2021-08-13 RX ORDER — OXYCODONE HYDROCHLORIDE 5 MG/1
5 TABLET ORAL EVERY 6 HOURS PRN
Qty: 15 TABLET | Refills: 0 | Status: SHIPPED | OUTPATIENT
Start: 2021-08-13 | End: 2021-10-18

## 2021-08-13 RX ORDER — IBUPROFEN 800 MG/1
800 TABLET, FILM COATED ORAL EVERY 6 HOURS
Qty: 30 TABLET | Refills: 0 | Status: SHIPPED | OUTPATIENT
Start: 2021-08-13 | End: 2021-10-18

## 2021-08-13 RX ADMIN — PRENATAL VITAMINS-IRON FUMARATE 27 MG IRON-FOLIC ACID 0.8 MG TABLET 1 TABLET: at 09:30

## 2021-08-13 RX ADMIN — OXYCODONE HYDROCHLORIDE 5 MG: 5 TABLET ORAL at 10:30

## 2021-08-13 RX ADMIN — OXYCODONE HYDROCHLORIDE 5 MG: 5 TABLET ORAL at 06:34

## 2021-08-13 RX ADMIN — IBUPROFEN 800 MG: 800 TABLET, FILM COATED ORAL at 09:31

## 2021-08-13 RX ADMIN — DOCUSATE SODIUM AND SENNOSIDES 1 TABLET: 8.6; 5 TABLET, FILM COATED ORAL at 09:30

## 2021-08-13 RX ADMIN — IBUPROFEN 800 MG: 800 TABLET, FILM COATED ORAL at 03:38

## 2021-08-13 RX ADMIN — FERROUS SULFATE TAB 325 MG (65 MG ELEMENTAL FE) 325 MG: 325 (65 FE) TAB at 09:30

## 2021-08-13 RX ADMIN — ACETAMINOPHEN 975 MG: 325 TABLET, FILM COATED ORAL at 06:32

## 2021-08-13 RX ADMIN — ACETAMINOPHEN 975 MG: 325 TABLET, FILM COATED ORAL at 00:25

## 2021-08-13 RX ADMIN — OXYCODONE HYDROCHLORIDE 5 MG: 5 TABLET ORAL at 00:25

## 2021-08-13 NOTE — PLAN OF CARE
Patient vitally stable, voiding without issue, tolerating regular diet, ambulating independently. Postpartum checks WDL. Incision with steri strips DILSHAD. Pain adequately managed with scheduled Tylenol, Ibuprofen and prn Oxycodone. Is using abdominal binder. Pumping for infant in NICU. Encouraged patient to reach out to her NICU nurses for lactation assistance when infant is ready to go to breast. Patient has inverted nipples, everting techniques and options discussed. Home pumping schedule reviewed. Significant other present and supportive. Positive bonding behaviors observed at NICU bedside when writer brought medications to patient.     Discharge education reviewed with patient. All questions answered. Breast pump given to patient per her request. Discharge medications given to and reviewed with patient. Patient discharged at 1210.

## 2021-08-13 NOTE — PROGRESS NOTES
"OB Post-op  Section Progress Note POD#3    S:  Patient doing well.  Pain is well controlled.  Ambulating.  Tolerating regular diet.  No N/V.  Passing flatus.  Voiding.  Bleeding is normal.  Breastfeeding.    O:  /72   Pulse 73   Temp 98  F (36.7  C) (Oral)   Resp 18   Ht 1.575 m (5' 2\")   Wt 105.2 kg (232 lb)   SpO2 100%   Breastfeeding Unknown   BMI 42.43 kg/m    Gen- A&O, NAD  Abd- soft, non-tender, no rebound or guarding, fundus firm at umbilicus  Incision- C/D/I  Ext- non-tender, no edema    Hemoglobin   Date Value Ref Range Status   2021 8.5 (L) 11.7 - 15.7 g/dL Final   2020 11.9 11.7 - 15.7 g/dL Final     O POS   Rubella immune    A/P:  31 year old  POD#3 s/p RLTCS and bilateral salpingectomy at 37 weeks due to labor and desire for permanent sterilization.    -  Routine post-op cares  - Rh pos, Rubella immune  - Pumping for baby in NICU  - Acute blood loss anemia, asymptomatic. Hgb 8.5 mL.  Supplement with PO iron.    Dispo:  Home today. Discharge instructions reviewed. All questions answered    Audra Omalley MD  2021  9:04 AM     "

## 2021-08-13 NOTE — PLAN OF CARE
Assumed care at 1900. Pt meeting expected outcomes. Vitals stable. Fundus firm and midline. Denies difficulty voiding. Incision with steri-strips. Pain controlled with oral medications. Independent with self cares. Pumping for baby in NICU, visiting often. Plan for discharge home today.

## 2021-08-16 ENCOUNTER — LACTATION ENCOUNTER (OUTPATIENT)
Age: 31
End: 2021-08-16

## 2021-08-17 ENCOUNTER — LACTATION ENCOUNTER (OUTPATIENT)
Age: 31
End: 2021-08-17

## 2021-08-17 NOTE — LACTATION NOTE
This note was copied from a baby's chart.  Lactation in to see patient and assist with a breastfeeding session this evening. May has inverted nipples and wanted to latch baby without shield. Latch assist used, not effective enough to get baby girl on. Medium shield used. Baby girl eager  to latch needing milk above shield to get into a nutritive suck pattern. Baby eventually switched and audible swallows heard and pointed out to parents. Shield full of milk. Baby nursed in football hold. Does turn her head away from breasts and needs redirecting to stay on. Mother also needing reminder to keep breasts lifted to keep nipple in baby's mouth. Baby burped and placed on second side where baby latched but did not get into a nutritive suck pattern despite milk above shield. Before and after weight measures 12 mls per bedside nurse.   May is pumping around the clock, reminded to not go longer than 3 hours. Yielding 1.5 ounces while pumping. Encouraged keeping up with fluids and calories to try to increase volume. Baby 6 days old. Plan to follow up with a feed tomorrow when May at bedside.

## 2021-08-18 NOTE — LACTATION NOTE
This note was copied from a baby's chart.  Lactation in to follow up with parents. Baby girl on left side in football hold at time. Baby sucking, needing some stimulation to keep nursing. Baby nursed both sides. Switched to cross body for part of feed. Will go on active spurts then tire. Some swallows heard. Baby does seem to turn away from latch on both side, needing readjusting. Encouraged patient to pump every 3 hours and hand express to get more volume. Patient pumping on one of the lowest levels. Encouraged to keep calling for assistance.

## 2021-08-30 NOTE — DISCHARGE SUMMARY
Service Date: 2021  Discharge Date: 2021    PRIMARY DIAGNOSES:    1.  Intrauterine pregnancy at 37+2 weeks.  2.  Contractions.  3.  History of previous  section.    DISCHARGE DIAGNOSES:    1.  Intrauterine pregnancy at 37+2 weeks.  2.  Contractions.  3.  History of previous  section.  4.  Acute blood loss anemia.    HOSPITAL COURSE:  This patient is a 31-year-old G3, P2-0-0-2 at 37+2 weeks' gestation who presented to labor and delivery on the day of admission with contractions.  Of note, the patient does have a history of previous  section and does desire a repeat  section.  Please see admission history and physical for further details.    HOSPITAL COURSE:  The patient was initially admitted to labor and delivery for observation.  On arrival to labor and delivery, her cervix was 1-2 cm, 40-50%/ -2 station.  She was stephen every 2-4 minutes.  She received a liter of lactated Ringer's over 2 hours and 1 dose of terbutaline.  Her contractions continued despite this and she complained of pressure.  Her cervix was rechecked and she was 2-3 cm/50%/ -2 to -3 station at approximately 2:00 a.m.  Continued observation was planned.  She received Vistaril 100 mg p.o. for pain and sleep.  At 5:00 a.m., the patient was still having contractions and felt they were stronger.  She was checked and was 3 cm/60%/ -2 to -3, per the RN.  At 5:15 a.m. the patient was checked by Dr. Artis at that time, she was felt to be 2 cm, 60%, -3 station.  Expectant management was continued and the patient, but the patient remained uncomfortable.  I took over care of this patient is a 8:00 a.m.  Situation was discussed with the patient including her continued contractions, which were painful, a small amount of cervical change and early labor.  It was recommended to proceed with repeat  section due to early labor at 37+ weeks' gestation.  The patient was consented for  section and  proceeded to the operating room.    PROCEDURE: Again, the patient was taken to the operating room, a repeat low transverse  section and bilateral salpingectomy was performed under spinal anesthesia.  The patient had discussed and requested permanent sterilization during her pregnancy.  This was discussed again preoperatively and immediately before salpingectomy and she did wish to proceed.  At 7:39 a.m. a female infant was delivered from the vertex presentation without difficulty.  Baby weighed 6 pounds 5 ounces.  She was vigorous immediately following delivery and delayed cord clamping for 1 minute was done.  At 1 minute, the cord was clamped and cut.  Apgar was 8.  Baby's Apgar at 5 minutes was 3 and at 10 was 5.  NICU was called.  Baby received CPAP and was taken to the NICU for further observation.  Quantitative blood loss for surgery was 430 mL There were no complications.  Please see operative report for further details.    POSTOPERATIVE COURSE:  The patient followed a routine postoperative course.  Her hemoglobin on postoperative day #1 was 8.5.  On postoperative day 3, the patient was ready for discharge to home.  She was given routine discharge instructions.  She was asymptomatic with her acute blood loss anemia with a hemoglobin of 8.5.  It was recommended that she supplement with oral iron.  She was given prescriptions for ibuprofen, oxycodone and Cornelia-Colace.  She will follow up at 2 and 6 weeks for postpartum checkups.    Mena Park MD        D: 2021   T: 2021   MT: [id number]    Name:     CLARICE DUNHAM  MRN:      [id number]        Account:      [id number]   :      1990           Service Date: 2021                                  Discharge Date: 2021     Document: [id number]

## 2021-09-05 ENCOUNTER — HEALTH MAINTENANCE LETTER (OUTPATIENT)
Age: 31
End: 2021-09-05

## 2021-10-18 ENCOUNTER — OFFICE VISIT (OUTPATIENT)
Dept: URGENT CARE | Facility: URGENT CARE | Age: 31
End: 2021-10-18
Payer: COMMERCIAL

## 2021-10-18 VITALS
WEIGHT: 225 LBS | SYSTOLIC BLOOD PRESSURE: 125 MMHG | TEMPERATURE: 98.2 F | DIASTOLIC BLOOD PRESSURE: 87 MMHG | HEART RATE: 64 BPM | OXYGEN SATURATION: 98 % | BODY MASS INDEX: 41.15 KG/M2

## 2021-10-18 DIAGNOSIS — R50.9 FEVER, UNSPECIFIED FEVER CAUSE: Primary | ICD-10-CM

## 2021-10-18 DIAGNOSIS — A08.4 VIRAL GASTROENTERITIS: ICD-10-CM

## 2021-10-18 LAB
DEPRECATED S PYO AG THROAT QL EIA: NEGATIVE
GROUP A STREP BY PCR: NOT DETECTED

## 2021-10-18 PROCEDURE — 99213 OFFICE O/P EST LOW 20 MIN: CPT | Performed by: PHYSICIAN ASSISTANT

## 2021-10-18 PROCEDURE — U0005 INFEC AGEN DETEC AMPLI PROBE: HCPCS | Performed by: PHYSICIAN ASSISTANT

## 2021-10-18 PROCEDURE — 87651 STREP A DNA AMP PROBE: CPT | Performed by: PHYSICIAN ASSISTANT

## 2021-10-18 PROCEDURE — U0003 INFECTIOUS AGENT DETECTION BY NUCLEIC ACID (DNA OR RNA); SEVERE ACUTE RESPIRATORY SYNDROME CORONAVIRUS 2 (SARS-COV-2) (CORONAVIRUS DISEASE [COVID-19]), AMPLIFIED PROBE TECHNIQUE, MAKING USE OF HIGH THROUGHPUT TECHNOLOGIES AS DESCRIBED BY CMS-2020-01-R: HCPCS | Performed by: PHYSICIAN ASSISTANT

## 2021-10-18 NOTE — PROGRESS NOTES
SUBJECTIVE:   Elayne Burciaga is a 31 year old female presenting with a chief complaint of fever, chills, sore throat, headache, vomiting and diarrhea.  No real cough or cold sx and no SOB or chest pain.  Thinks could be from bad sushi.  No loss of taste or smell.  No ear pain.  She is vaccinated for COVID    Onset of symptoms was 2 day(s) ago.  Course of illness is same.    Severity moderate  Current and Associated symptoms: negative other than stated above  Treatment measures tried include Tylenol/Ibuprofen, Fluids, Rest and OTC  Med and teas.  Predisposing factors include no exposure that aware of .  She recently gave birth 2 months ago      Patient Active Problem List   Diagnosis     Acne vulgaris     Morbid obesity (H)     Encounter for triage in pregnant patient       Current Outpatient Medications   Medication Sig Dispense Refill     ferrous sulfate (FEROSUL) 325 (65 Fe) MG tablet Take 325 mg by mouth daily (with breakfast)       Prenatal Vit-Fe Fumarate-FA (PRENATAL MULTIVITAMIN W/IRON) 27-0.8 MG tablet Take 1 tablet by mouth daily 90 tablet 0     Prenatal Vit-Fe Fumarate-FA (PRENATAL MULTIVITAMIN W/IRON) 27-0.8 MG tablet Take 1 tablet by mouth daily       Social History     Tobacco Use     Smoking status: Never Smoker     Smokeless tobacco: Never Used   Substance Use Topics     Alcohol use: Not Currently       ROS:  Review of systems negative except as stated above.    OBJECTIVE:  /87   Pulse 64   Temp 98.2  F (36.8  C)   Wt 102.1 kg (225 lb)   SpO2 98%   BMI 41.15 kg/m    GENERAL APPEARANCE: healthy, alert and no distress  EYES: EOMI,  PERRL, conjunctiva clear  HENT: ear canals and TM's normal.  Nose and mouth without ulcers, erythema or lesions  NECK: supple, nontender, no lymphadenopathy  RESP: lungs clear to auscultation - no rales, rhonchi or wheezes  CV: regular rates and rhythm, normal S1 S2, no murmur noted  ABDOMEN:  soft, nontender, no HSM or masses and bowel sounds normal  NEURO: Normal  strength and tone, sensory exam grossly normal,  normal speech and mentation  SKIN: no suspicious lesions or rashes    Results for orders placed or performed in visit on 10/18/21   Streptococcus A Rapid Screen w/Reflex to PCR     Status: Normal    Specimen: Throat; Swab   Result Value Ref Range    Group A Strep antigen Negative Negative       COVID  Results pending     assessment/plan:  (R50.9) Fever, unspecified fever cause  (primary encounter diagnosis)  Comment:   Plan: Streptococcus A Rapid Screen w/Reflex to PCR,         Symptomatic COVID-19 Virus (Coronavirus) by PCR        Nose, Group A Streptococcus PCR Throat Swab          Patient overall appears well and no red flag signs   Negative strep and COVID pending and will continue with OTC med for sx relief, fluids and rest. Red flag signs discussed and to Follow-up with PCP as needed. Note for work given     (A08.4) Viral gastroenteritis  Comment:   Plan:  Fluids encouraged and signs of dehydration.  OTC med for sx relief.  May be related to bad sushi that she ate.  Red flag signs to monitor discussed  Follow-up with PCP as needed

## 2021-10-18 NOTE — LETTER
[facility] [place]  [address]  [address]  [place] [address] [phone]      October 18, 2021    RE:  Elayne Burciaga                                                                                                                                                       [address]  [place] [address]            To whom it may concern:    Elayne Burciaga is under my professional care for medical illness  Patient has been seen and evaluated  Please excuse from missed work today and tomorrow. She does have a COVID test pending            Sincerely,        Antoinette [facility]AN [facility]

## 2021-10-19 LAB — SARS-COV-2 RNA RESP QL NAA+PROBE: NEGATIVE

## 2021-11-05 ENCOUNTER — APPOINTMENT (OUTPATIENT)
Dept: URBAN - METROPOLITAN AREA CLINIC 253 | Age: 31
Setting detail: DERMATOLOGY
End: 2021-11-05

## 2021-11-05 VITALS — WEIGHT: 225 LBS | HEIGHT: 55 IN | RESPIRATION RATE: 15 BRPM

## 2021-11-05 DIAGNOSIS — L91.0 HYPERTROPHIC SCAR: ICD-10-CM

## 2021-11-05 DIAGNOSIS — L70.0 ACNE VULGARIS: ICD-10-CM

## 2021-11-05 PROCEDURE — OTHER ADDITIONAL NOTES: OTHER

## 2021-11-05 PROCEDURE — 99204 OFFICE O/P NEW MOD 45 MIN: CPT

## 2021-11-05 PROCEDURE — OTHER COUNSELING: OTHER

## 2021-11-05 PROCEDURE — OTHER MIPS QUALITY: OTHER

## 2021-11-05 PROCEDURE — OTHER PHOTO-DOCUMENTATION: OTHER

## 2021-11-05 PROCEDURE — OTHER PRESCRIPTION: OTHER

## 2021-11-05 RX ORDER — SPIRONOLACTONE 50 MG/1
50 MG TABLET, FILM COATED ORAL BID
Qty: 60 | Refills: 1 | Status: ERX | COMMUNITY
Start: 2021-11-05

## 2021-11-05 ASSESSMENT — LOCATION SIMPLE DESCRIPTION DERM
LOCATION SIMPLE: RIGHT BREAST
LOCATION SIMPLE: LEFT BREAST
LOCATION SIMPLE: UPPER BACK

## 2021-11-05 ASSESSMENT — LOCATION DETAILED DESCRIPTION DERM
LOCATION DETAILED: RIGHT MEDIAL BREAST 1-2:00 REGION
LOCATION DETAILED: LEFT MEDIAL BREAST 11-12:00 REGION
LOCATION DETAILED: SUPERIOR THORACIC SPINE

## 2021-11-05 ASSESSMENT — LOCATION ZONE DERM: LOCATION ZONE: TRUNK

## 2021-11-05 NOTE — HPI: PIMPLES (ACNE)
What Type Of Note Output Would You Prefer (Optional)?: Standard Output
Is This A New Presentation, Or A Follow-Up?: Acne
Additional Comments (Use Complete Sentences): She is concerned about acne and aging. She used accutane about six years ago. Tried a curology topical recently. Never tried Spironolactone.

## 2021-11-05 NOTE — PROCEDURE: ADDITIONAL NOTES
Render Risk Assessment In Note?: no
Additional Notes: Will plan to do injections in another appointment. Photos taken today.\\nPatient has had radiation treatment before but feels injections were nest\\n\\nReviewed Spironolactone risks and side effects with the patient including but not limited to lightheadedness and dizziness, headaches, breast tenderness, menstrual irregularities and spotting, heart palpitations, muscle fatigue or weakness, and signs of renal or liver toxicity. Told patient to call the clinic and stop taking the medication if she experiences any of these serious side effects. Emphasized she should not become pregnant while taking this medication. All questions were answered regarding this medication.\\n\\nA clinical assistant was present for the exam. Care instructions were explained to the patient in detail. Told patient to call with any concerns or questions. The patient verbalized understanding and agreement of the education provided and the treatment plan. Encouraged patient to schedule a follow up appointment right after visit. At the end of the visit, all questions had been answered and the patient was satisfied with the visit.
Detail Level: Simple
Additional Notes: Provided aklief samples. Call for full RX if you like it.

## 2021-11-05 NOTE — PROCEDURE: COUNSELING
Azithromycin Pregnancy And Lactation Text: This medication is considered safe during pregnancy and is also secreted in breast milk.
High Dose Vitamin A Pregnancy And Lactation Text: High dose vitamin A therapy is contraindicated during pregnancy and breast feeding.
Bactrim Counseling:  I discussed with the patient the risks of sulfa antibiotics including but not limited to GI upset, allergic reaction, drug rash, diarrhea, dizziness, photosensitivity, and yeast infections.  Rarely, more serious reactions can occur including but not limited to aplastic anemia, agranulocytosis, methemoglobinemia, blood dyscrasias, liver or kidney failure, lung infiltrates or desquamative/blistering drug rashes.
Use Enhanced Medication Counseling?: No
Topical Retinoid counseling:  Patient advised to apply a pea-sized amount only at bedtime and wait 30 minutes after washing their face before applying.  If too drying, patient may add a non-comedogenic moisturizer. The patient verbalized understanding of the proper use and possible adverse effects of retinoids.  All of the patient's questions and concerns were addressed.
Bactrim Pregnancy And Lactation Text: This medication is Pregnancy Category D and is known to cause fetal risk.  It is also excreted in breast milk.
Minocycline Pregnancy And Lactation Text: This medication is Pregnancy Category D and not consider safe during pregnancy. It is also excreted in breast milk.
Tazorac Counseling:  Patient advised that medication is irritating and drying.  Patient may need to apply sparingly and wash off after an hour before eventually leaving it on overnight.  The patient verbalized understanding of the proper use and possible adverse effects of tazorac.  All of the patient's questions and concerns were addressed.
Birth Control Pills Pregnancy And Lactation Text: This medication should be avoided if pregnant and for the first 30 days post-partum.
Erythromycin Pregnancy And Lactation Text: This medication is Pregnancy Category B and is considered safe during pregnancy. It is also excreted in breast milk.
Doxycycline Counseling:  Patient counseled regarding possible photosensitivity and increased risk for sunburn.  Patient instructed to avoid sunlight, if possible.  When exposed to sunlight, patients should wear protective clothing, sunglasses, and sunscreen.  The patient was instructed to call the office immediately if the following severe adverse effects occur:  hearing changes, easy bruising/bleeding, severe headache, or vision changes.  The patient verbalized understanding of the proper use and possible adverse effects of doxycycline.  All of the patient's questions and concerns were addressed.
Isotretinoin Counseling: Patient should get monthly blood tests, not donate blood, not drive at night if vision affected, not share medication, and not undergo elective surgery for 6 months after tx completed. Side effects reviewed, pt to contact office should one occur.
Birth Control Pills Counseling: Birth Control Pill Counseling: I discussed with the patient the potential side effects of OCPs including but not limited to increased risk of stroke, heart attack, thrombophlebitis, deep venous thrombosis, hepatic adenomas, breast changes, GI upset, headaches, and depression.  The patient verbalized understanding of the proper use and possible adverse effects of OCPs. All of the patient's questions and concerns were addressed.
Minocycline Counseling: Patient advised regarding possible photosensitivity and discoloration of the teeth, skin, lips, tongue and gums.  Patient instructed to avoid sunlight, if possible.  When exposed to sunlight, patients should wear protective clothing, sunglasses, and sunscreen.  The patient was instructed to call the office immediately if the following severe adverse effects occur:  hearing changes, easy bruising/bleeding, severe headache, or vision changes.  The patient verbalized understanding of the proper use and possible adverse effects of minocycline.  All of the patient's questions and concerns were addressed.
Benzoyl Peroxide Counseling: Patient counseled that medicine may cause skin irritation and bleach clothing.  In the event of skin irritation, the patient was advised to reduce the amount of the drug applied or use it less frequently.   The patient verbalized understanding of the proper use and possible adverse effects of benzoyl peroxide.  All of the patient's questions and concerns were addressed.
Detail Level: Simple
Tazorac Pregnancy And Lactation Text: This medication is not safe during pregnancy. It is unknown if this medication is excreted in breast milk.
Topical Clindamycin Pregnancy And Lactation Text: This medication is Pregnancy Category B and is considered safe during pregnancy. It is unknown if it is excreted in breast milk.
Erythromycin Counseling:  I discussed with the patient the risks of erythromycin including but not limited to GI upset, allergic reaction, drug rash, diarrhea, increase in liver enzymes, and yeast infections.
Detail Level: Zone
Sarecycline Counseling: Patient advised regarding possible photosensitivity and discoloration of the teeth, skin, lips, tongue and gums.  Patient instructed to avoid sunlight, if possible.  When exposed to sunlight, patients should wear protective clothing, sunglasses, and sunscreen.  The patient was instructed to call the office immediately if the following severe adverse effects occur:  hearing changes, easy bruising/bleeding, severe headache, or vision changes.  The patient verbalized understanding of the proper use and possible adverse effects of sarecycline.  All of the patient's questions and concerns were addressed.
Isotretinoin Pregnancy And Lactation Text: This medication is Pregnancy Category X and is considered extremely dangerous during pregnancy. It is unknown if it is excreted in breast milk.
Topical Sulfur Applications Pregnancy And Lactation Text: This medication is Pregnancy Category C and has an unknown safety profile during pregnancy. It is unknown if this topical medication is excreted in breast milk.
Topical Retinoid Pregnancy And Lactation Text: This medication is Pregnancy Category C. It is unknown if this medication is excreted in breast milk.
Benzoyl Peroxide Pregnancy And Lactation Text: This medication is Pregnancy Category C. It is unknown if benzoyl peroxide is excreted in breast milk.
Topical Sulfur Applications Counseling: Topical Sulfur Counseling: Patient counseled that this medication may cause skin irritation or allergic reactions.  In the event of skin irritation, the patient was advised to reduce the amount of the drug applied or use it less frequently.   The patient verbalized understanding of the proper use and possible adverse effects of topical sulfur application.  All of the patient's questions and concerns were addressed.
Azithromycin Counseling:  I discussed with the patient the risks of azithromycin including but not limited to GI upset, allergic reaction, drug rash, diarrhea, and yeast infections.
Tetracycline Counseling: Patient counseled regarding possible photosensitivity and increased risk for sunburn.  Patient instructed to avoid sunlight, if possible.  When exposed to sunlight, patients should wear protective clothing, sunglasses, and sunscreen.  The patient was instructed to call the office immediately if the following severe adverse effects occur:  hearing changes, easy bruising/bleeding, severe headache, or vision changes.  The patient verbalized understanding of the proper use and possible adverse effects of tetracycline.  All of the patient's questions and concerns were addressed. Patient understands to avoid pregnancy while on therapy due to potential birth defects.
Dapsone Pregnancy And Lactation Text: This medication is Pregnancy Category C and is not considered safe during pregnancy or breast feeding.
High Dose Vitamin A Counseling: Side effects reviewed, pt to contact office should one occur.
Spironolactone Counseling: Patient advised regarding risks of diarrhea, abdominal pain, hyperkalemia, birth defects (for female patients), liver toxicity and renal toxicity. The patient may need blood work to monitor liver and kidney function and potassium levels while on therapy. The patient verbalized understanding of the proper use and possible adverse effects of spironolactone.  All of the patient's questions and concerns were addressed.
Spironolactone Pregnancy And Lactation Text: This medication can cause feminization of the male fetus and should be avoided during pregnancy. The active metabolite is also found in breast milk.
Doxycycline Pregnancy And Lactation Text: This medication is Pregnancy Category D and not consider safe during pregnancy. It is also excreted in breast milk but is considered safe for shorter treatment courses.
Dapsone Counseling: I discussed with the patient the risks of dapsone including but not limited to hemolytic anemia, agranulocytosis, rashes, methemoglobinemia, kidney failure, peripheral neuropathy, headaches, GI upset, and liver toxicity.  Patients who start dapsone require monitoring including baseline LFTs and weekly CBCs for the first month, then every month thereafter.  The patient verbalized understanding of the proper use and possible adverse effects of dapsone.  All of the patient's questions and concerns were addressed.
Topical Clindamycin Counseling: Patient counseled that this medication may cause skin irritation or allergic reactions.  In the event of skin irritation, the patient was advised to reduce the amount of the drug applied or use it less frequently.   The patient verbalized understanding of the proper use and possible adverse effects of clindamycin.  All of the patient's questions and concerns were addressed.

## 2021-11-09 ENCOUNTER — APPOINTMENT (OUTPATIENT)
Dept: URBAN - METROPOLITAN AREA CLINIC 253 | Age: 31
Setting detail: DERMATOLOGY
End: 2021-11-10

## 2021-11-09 VITALS — HEIGHT: 60 IN | RESPIRATION RATE: 14 BRPM | WEIGHT: 225 LBS

## 2021-11-09 DIAGNOSIS — L91.0 HYPERTROPHIC SCAR: ICD-10-CM

## 2021-11-09 PROCEDURE — OTHER INTRALESIONAL KENALOG: OTHER

## 2021-11-09 PROCEDURE — OTHER MIPS QUALITY: OTHER

## 2021-11-09 PROCEDURE — 11901 INJECT SKIN LESIONS >7: CPT

## 2021-11-09 PROCEDURE — OTHER ADDITIONAL NOTES: OTHER

## 2021-11-09 PROCEDURE — OTHER COUNSELING: OTHER

## 2021-11-09 ASSESSMENT — LOCATION DETAILED DESCRIPTION DERM
LOCATION DETAILED: LEFT MID-UPPER BACK
LOCATION DETAILED: LEFT LATERAL SUPERIOR CHEST
LOCATION DETAILED: LEFT MEDIAL SUPERIOR CHEST
LOCATION DETAILED: RIGHT MEDIAL BREAST 1-2:00 REGION
LOCATION DETAILED: RIGHT MEDIAL BREAST 2-3:00 REGION
LOCATION DETAILED: LEFT SUPERIOR UPPER BACK
LOCATION DETAILED: RIGHT LATERAL BREAST 9-10:00 REGION
LOCATION DETAILED: LEFT MEDIAL BREAST 10-11:00 REGION
LOCATION DETAILED: RIGHT MEDIAL SUPERIOR CHEST

## 2021-11-09 ASSESSMENT — LOCATION SIMPLE DESCRIPTION DERM
LOCATION SIMPLE: LEFT BREAST
LOCATION SIMPLE: LEFT UPPER BACK
LOCATION SIMPLE: RIGHT BREAST
LOCATION SIMPLE: CHEST

## 2021-11-09 ASSESSMENT — LOCATION ZONE DERM: LOCATION ZONE: TRUNK

## 2021-11-09 NOTE — PROCEDURE: INTRALESIONAL KENALOG
Detail Level: Detailed
X Size Of Lesion In Cm (Optional): 0
Medical Necessity Clause: This procedure was medically necessary because the lesions that were treated were:
Concentration Of Solution Injected (Mg/Ml): 10.0
Consent: The risks of atrophy were reviewed with the patient.
Include Z78.9 (Other Specified Conditions Influencing Health Status) As An Associated Diagnosis?: No
Total Volume Injected (Ccs- Only Use Numbers And Decimals): 0.2
Treatment Number (Optional): 1
Validate Note Data When Using Inventory: Yes
Kenalog Preparation: Kenalog
Administered By (Optional): Delicia Blake, CNP

## 2021-11-09 NOTE — HPI: SCAR (KELOIDS)
Is This A New Presentation, Or A Follow-Up?: Scars
Additional History: Multiple keloid sites she would like injected. She has had radiation before and didn’t think this was as helpful as prior injections. Some are really itchy, especially on her back

## 2021-12-26 ENCOUNTER — HEALTH MAINTENANCE LETTER (OUTPATIENT)
Age: 31
End: 2021-12-26

## 2021-12-29 ENCOUNTER — APPOINTMENT (OUTPATIENT)
Dept: URBAN - METROPOLITAN AREA CLINIC 256 | Age: 31
Setting detail: DERMATOLOGY
End: 2021-12-29

## 2021-12-29 VITALS — RESPIRATION RATE: 16 BRPM | HEIGHT: 64 IN | WEIGHT: 220 LBS

## 2021-12-29 DIAGNOSIS — L91.0 HYPERTROPHIC SCAR: ICD-10-CM

## 2021-12-29 DIAGNOSIS — L72.8 OTHER FOLLICULAR CYSTS OF THE SKIN AND SUBCUTANEOUS TISSUE: ICD-10-CM

## 2021-12-29 PROBLEM — D48.5 NEOPLASM OF UNCERTAIN BEHAVIOR OF SKIN: Status: ACTIVE | Noted: 2021-12-29

## 2021-12-29 PROCEDURE — OTHER ADDITIONAL NOTES: OTHER

## 2021-12-29 PROCEDURE — OTHER MIPS QUALITY: OTHER

## 2021-12-29 PROCEDURE — OTHER INTRALESIONAL KENALOG: OTHER

## 2021-12-29 PROCEDURE — 11900 INJECT SKIN LESIONS </W 7: CPT

## 2021-12-29 PROCEDURE — OTHER PRESCRIPTION: OTHER

## 2021-12-29 PROCEDURE — 99213 OFFICE O/P EST LOW 20 MIN: CPT | Mod: 25

## 2021-12-29 PROCEDURE — OTHER COUNSELING: OTHER

## 2021-12-29 RX ORDER — DOXYCYCLINE 100 MG/1
100MG CAPSULE ORAL BID
Qty: 28 | Refills: 0 | Status: ERX | COMMUNITY
Start: 2021-12-29

## 2021-12-29 ASSESSMENT — LOCATION DETAILED DESCRIPTION DERM
LOCATION DETAILED: GENITALIA
LOCATION DETAILED: LEFT MEDIAL BREAST 11-12:00 REGION
LOCATION DETAILED: RIGHT MEDIAL BREAST 3-4:00 REGION
LOCATION DETAILED: RIGHT MEDIAL BREAST 2-3:00 REGION

## 2021-12-29 ASSESSMENT — LOCATION SIMPLE DESCRIPTION DERM
LOCATION SIMPLE: RIGHT BREAST
LOCATION SIMPLE: GENITALIA
LOCATION SIMPLE: LEFT BREAST

## 2021-12-29 ASSESSMENT — LOCATION ZONE DERM: LOCATION ZONE: TRUNK

## 2021-12-29 NOTE — HPI: CYST
How Severe Is Your Cyst?: moderate
Is This A New Presentation, Or A Follow-Up?: Cyst
Additional History: She had some chills two days ago, she took some Tylenol and ibuprofen. That got better. The cyst drained last night when she accidentally nicked it. It popped and drained pus, greenish, whitish with bad odor.\\n\\nShe also wants to talk about her keloids. They are better since the injection last time. A few are still a little raised but overall they’re a lot better. The raised spots aren’t bothering her as much as they did before.

## 2021-12-29 NOTE — PROCEDURE: INTRALESIONAL KENALOG
Administered By (Optional): Delicia Blake
Concentration Of Solution Injected (Mg/Ml): 10.0
Total Volume Injected (Ccs- Only Use Numbers And Decimals): 0.2
Validate Note Data When Using Inventory: Yes
Include Z78.9 (Other Specified Conditions Influencing Health Status) As An Associated Diagnosis?: No
Consent: The risks of atrophy were reviewed with the patient.
Detail Level: Detailed
X Size Of Lesion In Cm (Optional): 0
Kenalog Preparation: Kenalog
Medical Necessity Clause: This procedure was medically necessary because the lesions that were treated were:

## 2021-12-29 NOTE — PROCEDURE: COUNSELING
Detail Level: Detailed
Patient Specific Counseling (Will Not Stick From Patient To Patient): Recommended she  chlorhexadine or dial wash and wash area. Call if she develops increase or worsening in symptoms, or fever and chills continue\\nShe has never had a lesion like this before. It is very tender\\n\\nCall if she develops more lesions such as this

## 2021-12-29 NOTE — PROCEDURE: ADDITIONAL NOTES
Detail Level: Simple
Additional Notes: A clinical assistant was present for the exam. Care instructions were explained to the patient in detail. Told patient to call with any concerns or questions. The patient verbalized understanding and agreement of the education provided and the treatment plan. Encouraged patient to schedule a follow up appointment right after visit. At the end of the visit, all questions had been answered and the patient was satisfied with the visit.
Render Risk Assessment In Note?: no

## 2022-02-20 ENCOUNTER — HEALTH MAINTENANCE LETTER (OUTPATIENT)
Age: 32
End: 2022-02-20

## 2022-10-29 ENCOUNTER — HEALTH MAINTENANCE LETTER (OUTPATIENT)
Age: 32
End: 2022-10-29

## 2022-12-21 ENCOUNTER — APPOINTMENT (OUTPATIENT)
Dept: URBAN - METROPOLITAN AREA CLINIC 253 | Age: 32
Setting detail: DERMATOLOGY
End: 2022-12-28

## 2022-12-21 VITALS — WEIGHT: 220 LBS | HEIGHT: 64 IN | RESPIRATION RATE: 14 BRPM

## 2022-12-21 DIAGNOSIS — L70.0 ACNE VULGARIS: ICD-10-CM

## 2022-12-21 DIAGNOSIS — L91.0 HYPERTROPHIC SCAR: ICD-10-CM

## 2022-12-21 DIAGNOSIS — L82.1 OTHER SEBORRHEIC KERATOSIS: ICD-10-CM

## 2022-12-21 PROCEDURE — OTHER ISOTRETINOIN INITIATION: OTHER

## 2022-12-21 PROCEDURE — OTHER INTRALESIONAL KENALOG: OTHER

## 2022-12-21 PROCEDURE — OTHER MIPS QUALITY: OTHER

## 2022-12-21 PROCEDURE — 11900 INJECT SKIN LESIONS </W 7: CPT

## 2022-12-21 PROCEDURE — OTHER BIOPSY BY SHAVE METHOD: OTHER

## 2022-12-21 PROCEDURE — 99213 OFFICE O/P EST LOW 20 MIN: CPT | Mod: 25

## 2022-12-21 PROCEDURE — OTHER PRESCRIPTION: OTHER

## 2022-12-21 PROCEDURE — 11103 TANGNTL BX SKIN EA SEP/ADDL: CPT

## 2022-12-21 PROCEDURE — 81025 URINE PREGNANCY TEST: CPT

## 2022-12-21 PROCEDURE — OTHER COUNSELING: OTHER

## 2022-12-21 PROCEDURE — 11102 TANGNTL BX SKIN SINGLE LES: CPT

## 2022-12-21 PROCEDURE — OTHER ADDITIONAL NOTES: OTHER

## 2022-12-21 PROCEDURE — OTHER URINE PREGNANCY TEST: OTHER

## 2022-12-21 PROCEDURE — OTHER PHOTO-DOCUMENTATION: OTHER

## 2022-12-21 RX ORDER — DOXYCYCLINE 100 MG/1
100 MG CAPSULE ORAL BID
Qty: 60 | Refills: 0 | Status: CANCELLED

## 2022-12-21 ASSESSMENT — LOCATION DETAILED DESCRIPTION DERM
LOCATION DETAILED: RIGHT LOWER CUTANEOUS LIP
LOCATION DETAILED: LEFT INFERIOR LATERAL MALAR CHEEK
LOCATION DETAILED: LEFT MEDIAL BREAST 10-11:00 REGION
LOCATION DETAILED: RIGHT LATERAL SUPERIOR CHEST
LOCATION DETAILED: RIGHT MEDIAL SUPERIOR CHEST
LOCATION DETAILED: LEFT MEDIAL MALAR CHEEK
LOCATION DETAILED: RIGHT MEDIAL BREAST 12-1:00 REGION
LOCATION DETAILED: LEFT INFERIOR FOREHEAD

## 2022-12-21 ASSESSMENT — LOCATION ZONE DERM
LOCATION ZONE: LIP
LOCATION ZONE: FACE
LOCATION ZONE: TRUNK

## 2022-12-21 ASSESSMENT — LOCATION SIMPLE DESCRIPTION DERM
LOCATION SIMPLE: CHEST
LOCATION SIMPLE: RIGHT BREAST
LOCATION SIMPLE: LEFT BREAST
LOCATION SIMPLE: RIGHT LIP
LOCATION SIMPLE: LEFT FOREHEAD
LOCATION SIMPLE: LEFT CHEEK

## 2022-12-21 NOTE — PROCEDURE: ADDITIONAL NOTES
Additional Notes: We will plan to initiate Accutane and the patient was registered in [facility] [employer] [facility] today. We will obtain fasting labs next month and a UPT was obtained today. Additional Notes: We will plan to initiate Accutane and the patient was registered in [facility] today. We will obtain fasting labs next month and a UPT was obtained today.

## 2022-12-21 NOTE — PROCEDURE: BIOPSY BY SHAVE METHOD
Biopsy Method: Dermablade
Additional Anesthesia Volume In Cc (Will Not Render If 0): 0
Was A Bandage Applied: Yes
Silver Nitrate Text: The wound bed was treated with silver nitrate after the biopsy was performed.
Cell Phone/PDA (specify)/Clothing
Destruction After The Procedure: No
Billing Type: Third-Party Bill
Cryotherapy Text: The wound bed was treated with cryotherapy after the biopsy was performed.
Hemostasis: Electrocautery
Consent: Written consent was obtained and risks were reviewed including but not limited to scarring, infection, bleeding, scabbing, incomplete removal, nerve damage and allergy to anesthesia.
Anesthesia Type: 2% lidocaine with epinephrine and a 1:10 solution of 8.4% sodium bicarbonate
Type Of Destruction Used: Curettage
Biopsy Type: H and E
Wound Care: Petrolatum
Notification Instructions: Patient will be notified of biopsy results. However, patient instructed to call the office if not contacted within 2 weeks.
Electrodesiccation Text: The wound bed was treated with electrodesiccation after the biopsy was performed.
Post-Care Instructions: I reviewed with the patient in detail post-care instructions. Patient is to keep the biopsy site dry overnight, and then apply bacitracin twice daily until healed. Patient may apply hydrogen peroxide soaks to remove any crusting.
Curettage Text: The wound bed was treated with curettage after the biopsy was performed.
Depth Of Biopsy: dermis
Electrodesiccation And Curettage Text: The wound bed was treated with electrodesiccation and curettage after the biopsy was performed.
Detail Level: Detailed
Information: Selecting Yes will display possible errors in your note based on the variables you have selected. This validation is only offered as a suggestion for you. PLEASE NOTE THAT THE VALIDATION TEXT WILL BE REMOVED WHEN YOU FINALIZE YOUR NOTE. IF YOU WANT TO FAX A PRELIMINARY NOTE YOU WILL NEED TO TOGGLE THIS TO 'NO' IF YOU DO NOT WANT IT IN YOUR FAXED NOTE.
Anesthesia Volume In Cc (Will Not Render If 0): 0.3
Dressing: bandage
Anesthesia Volume In Cc (Will Not Render If 0): 0.2
Hemostasis: Drysol
Anesthesia Volume In Cc (Will Not Render If 0): 0.1

## 2022-12-22 RX ORDER — DOXYCYCLINE 100 MG/1
100 MG CAPSULE ORAL BID
Qty: 60 | Refills: 0 | Status: ERX

## 2023-01-25 ENCOUNTER — APPOINTMENT (OUTPATIENT)
Dept: URBAN - METROPOLITAN AREA CLINIC 253 | Age: 33
Setting detail: DERMATOLOGY
End: 2023-01-30

## 2023-01-25 VITALS — HEIGHT: 62 IN | WEIGHT: 220 LBS

## 2023-01-25 DIAGNOSIS — L91.0 HYPERTROPHIC SCAR: ICD-10-CM

## 2023-01-25 DIAGNOSIS — L70.0 ACNE VULGARIS: ICD-10-CM

## 2023-01-25 PROCEDURE — OTHER URINE PREGNANCY TEST: OTHER

## 2023-01-25 PROCEDURE — OTHER VENIPUNCTURE: OTHER

## 2023-01-25 PROCEDURE — OTHER ORDER TESTS: OTHER

## 2023-01-25 PROCEDURE — OTHER COUNSELING: OTHER

## 2023-01-25 PROCEDURE — OTHER PRESCRIPTION: OTHER

## 2023-01-25 PROCEDURE — 36415 COLL VENOUS BLD VENIPUNCTURE: CPT

## 2023-01-25 PROCEDURE — OTHER ISOTRETINOIN INITIATION: OTHER

## 2023-01-25 PROCEDURE — 81025 URINE PREGNANCY TEST: CPT

## 2023-01-25 PROCEDURE — OTHER MIPS QUALITY: OTHER

## 2023-01-25 PROCEDURE — 99214 OFFICE O/P EST MOD 30 MIN: CPT

## 2023-01-25 RX ORDER — ISOTRETINOIN 20 MG/1
20MG CAPSULE, LIQUID FILLED ORAL QD
Qty: 30 | Refills: 0 | Status: ERX | COMMUNITY
Start: 2023-01-25

## 2023-01-25 ASSESSMENT — LOCATION ZONE DERM
LOCATION ZONE: ARM
LOCATION ZONE: FACE

## 2023-01-25 ASSESSMENT — LOCATION SIMPLE DESCRIPTION DERM
LOCATION SIMPLE: RIGHT UPPER ARM
LOCATION SIMPLE: LEFT CHEEK

## 2023-01-25 ASSESSMENT — LOCATION DETAILED DESCRIPTION DERM
LOCATION DETAILED: LEFT CENTRAL MALAR CHEEK
LOCATION DETAILED: RIGHT ANTECUBITAL SKIN

## 2023-01-25 NOTE — HPI: MEDICATION (ISOTRETINOIN)
How Severe Is It?: moderate
Is This A New Presentation, Or A Follow-Up?: Follow Up Acne
Additional History: Patient states that she did see some improvement in her acne when she was taking doxycycline. However, she is still interested in starting Accutane. The patient notes that she has been trying to drink more water and exercise more frequently.

## 2023-01-25 NOTE — PROCEDURE: COUNSELING
Detail Level: Detailed
Patient Specific Counseling (Will Not Stick From Patient To Patient): Cont to monitor

## 2023-01-25 NOTE — PROCEDURE: VENIPUNCTURE
Bill [id number] For Specimen Handling/Conveyance To Laboratory?: no
Detail Level: None
Venipuncture Paragraph: An alcohol pad was applied to the venipuncture site. Venipuncture was performed using a butterfly needle. Pressure and a bandaid was applied to the site. No complications were noted.
Number Of Tubes Drawn: 2

## 2023-04-02 ENCOUNTER — HEALTH MAINTENANCE LETTER (OUTPATIENT)
Age: 33
End: 2023-04-02

## 2024-04-01 ENCOUNTER — OFFICE VISIT (OUTPATIENT)
Dept: URGENT CARE | Facility: URGENT CARE | Age: 34
End: 2024-04-01

## 2024-04-01 VITALS
OXYGEN SATURATION: 100 % | BODY MASS INDEX: 45.91 KG/M2 | SYSTOLIC BLOOD PRESSURE: 140 MMHG | HEART RATE: 67 BPM | WEIGHT: 251 LBS | TEMPERATURE: 98 F | RESPIRATION RATE: 16 BRPM | DIASTOLIC BLOOD PRESSURE: 94 MMHG

## 2024-04-01 DIAGNOSIS — H60.502 ACUTE OTITIS EXTERNA OF LEFT EAR, UNSPECIFIED TYPE: Primary | ICD-10-CM

## 2024-04-01 DIAGNOSIS — H60.02 ABSCESS OF LEFT EAR CANAL: ICD-10-CM

## 2024-04-01 PROCEDURE — 99213 OFFICE O/P EST LOW 20 MIN: CPT | Performed by: PHYSICIAN ASSISTANT

## 2024-04-01 RX ORDER — OFLOXACIN 3 MG/ML
10 SOLUTION AURICULAR (OTIC) 2 TIMES DAILY
Qty: 5 ML | Refills: 0 | Status: SHIPPED | OUTPATIENT
Start: 2024-04-01

## 2024-04-01 NOTE — PROGRESS NOTES
SUBJECTIVE:  Elayne Burciaga is a 34 year old female who comes in with a 3-day history of left ear pain feeling plugged and decreased hearing.  Unsure if it has had fevers but has had the chills.  Denies any significant cough or cold symptoms.  No sore throat, headache, GI symptoms or rashes.  Baseline health.    No past medical history on file.  Patient Active Problem List   Diagnosis    Acne vulgaris    Morbid obesity (H)    Encounter for triage in pregnant patient     Current Outpatient Medications   Medication    ferrous sulfate (FEROSUL) 325 (65 Fe) MG tablet    Prenatal Vit-Fe Fumarate-FA (PRENATAL MULTIVITAMIN W/IRON) 27-0.8 MG tablet    Prenatal Vit-Fe Fumarate-FA (PRENATAL MULTIVITAMIN W/IRON) 27-0.8 MG tablet     No current facility-administered medications for this visit.     Social History     Socioeconomic History    Marital status:      Spouse name: Not on file    Number of children: Not on file    Years of education: Not on file    Highest education level: Not on file   Occupational History    Not on file   Tobacco Use    Smoking status: Never    Smokeless tobacco: Never   Substance and Sexual Activity    Alcohol use: Not Currently    Drug use: Never    Sexual activity: Yes     Partners: Male   Other Topics Concern    Not on file   Social History Narrative    Not on file     Social Determinants of Health     Financial Resource Strain: Not on file   Food Insecurity: Not on file   Transportation Needs: Not on file   Physical Activity: Not on file   Stress: Not on file   Social Connections: Not on file   Interpersonal Safety: Not on file   Housing Stability: Not on file     ROS  negative other than stated above    Exam:  GENERAL APPEARANCE: healthy, alert and no distress  EYES: EOMI,  PERRL  HENT: Right TM and canal is clear.  Left TM clear.  Does have a small abscess at the opening of the left ear canal.  Also has swelling of the canal with tenderness to palpation.  No drainage is noted.  Oral mucosa  moist with no erythema or exudate noted  NECK: no adenopathy, no asymmetry, masses, or scars and thyroid normal to palpation  RESP: lungs clear to auscultation - no rales, rhonchi or wheezes  CV: regular rates and rhythm, normal S1 S2, no S3 or S4 and no murmur, click or rub -  SKIN: no suspicious lesions or rashes    assessment/plan:  (H60.502) Acute otitis externa of left ear, unspecified type  (primary encounter diagnosis)  Comment:   Plan: ofloxacin (FLOXIN) 0.3 % otic solution        Patient with a small abscess of her left ear with otitis externa.  Will place on Augmentin along with Floxin otic drops.  Over-the-counter med as needed for pain.  Continue to monitor symptoms and return to clinic if symptoms worsen or new symptoms develop.    (H60.02) Abscess of left ear canal  Comment:   Plan: amoxicillin-clavulanate (AUGMENTIN) 875-125 MG         tablet

## 2024-06-08 ENCOUNTER — HEALTH MAINTENANCE LETTER (OUTPATIENT)
Age: 34
End: 2024-06-08

## 2025-06-15 ENCOUNTER — HEALTH MAINTENANCE LETTER (OUTPATIENT)
Age: 35
End: 2025-06-15

## (undated) DEVICE — LINEN FULL SHEET 5511

## (undated) DEVICE — GLOVE PROTEXIS BLUE W/NEU-THERA 6.5  2D73EB65

## (undated) DEVICE — RETR VISCERA FISH MED 3204

## (undated) DEVICE — GLOVE PROTEXIS W/NEU-THERA 6.5  2D73TE65

## (undated) DEVICE — BLADE KNIFE SURG 10 371110

## (undated) DEVICE — GLOVE PROTEXIS POWDER FREE SMT 7.0  2D72PT70X

## (undated) DEVICE — LINEN TOWEL PACK X10 5473

## (undated) DEVICE — PREP CHLORAPREP 26ML TINTED HI-LITE ORANGE 930815

## (undated) DEVICE — SU VICRYL 3-0 CT-1 36" J344H

## (undated) DEVICE — TRANSFER DEVICE BLOOD NDL HOLDER 364880

## (undated) DEVICE — SU MONOCRYL 4-0 PS-2 27" UND Y426H

## (undated) DEVICE — PACK C-SECTION LF PL15OTA83B

## (undated) DEVICE — DRSG STERI STRIP 1/2X4" R1547

## (undated) DEVICE — LINEN HALF SHEET 5512

## (undated) DEVICE — ESU PENCIL W/SMOKE EVAC CVPLP2000

## (undated) DEVICE — ESU LIGASURE OPEN SEALER/DIVIDER SM JAW 16.5MM LF1212A

## (undated) DEVICE — LINEN BABY BLANKET 5434

## (undated) DEVICE — SOL ADH LIQUID BENZOIN SWAB 0.6ML C1544

## (undated) DEVICE — SOL WATER IRRIG 1000ML BOTTLE 2F7114

## (undated) DEVICE — CAP BABY PINK/BLUE IC-2

## (undated) DEVICE — BLADE CLIPPER SGL USE 9680

## (undated) DEVICE — CATH TRAY FOLEY SURESTEP 16FR DRAIN BAG STATOCK A899916

## (undated) DEVICE — BAG CLEAR TRASH 1.3M 39X33" P4040C

## (undated) DEVICE — SUCTION MANIFOLD NEPTUNE 2 SYS 4 PORT 0702-020-000

## (undated) DEVICE — ESU GROUND PAD ADULT W/CORD E7507

## (undated) DEVICE — SOL NACL 0.9% IRRIG 1000ML BOTTLE 2F7124

## (undated) DEVICE — SU VICRYL 0 CT-1 36" J346H

## (undated) DEVICE — STOCKING SLEEVE VASOPRESS COMPRESSION CALF MED 18" VP501M

## (undated) DEVICE — Device

## (undated) RX ORDER — MORPHINE SULFATE 1 MG/ML
INJECTION, SOLUTION EPIDURAL; INTRATHECAL; INTRAVENOUS
Status: DISPENSED
Start: 2021-08-10

## (undated) RX ORDER — FENTANYL CITRATE-0.9 % NACL/PF 10 MCG/ML
PLASTIC BAG, INJECTION (ML) INTRAVENOUS
Status: DISPENSED
Start: 2021-08-10

## (undated) RX ORDER — FENTANYL CITRATE 50 UG/ML
INJECTION, SOLUTION INTRAMUSCULAR; INTRAVENOUS
Status: DISPENSED
Start: 2021-08-10

## (undated) RX ORDER — ONDANSETRON 2 MG/ML
INJECTION INTRAMUSCULAR; INTRAVENOUS
Status: DISPENSED
Start: 2021-08-10

## (undated) RX ORDER — OXYTOCIN/0.9 % SODIUM CHLORIDE 30/500 ML
PLASTIC BAG, INJECTION (ML) INTRAVENOUS
Status: DISPENSED
Start: 2021-08-10

## (undated) RX ORDER — EPHEDRINE SULFATE 50 MG/ML
INJECTION, SOLUTION INTRAMUSCULAR; INTRAVENOUS; SUBCUTANEOUS
Status: DISPENSED
Start: 2021-08-10